# Patient Record
Sex: MALE | Employment: FULL TIME | URBAN - METROPOLITAN AREA
[De-identification: names, ages, dates, MRNs, and addresses within clinical notes are randomized per-mention and may not be internally consistent; named-entity substitution may affect disease eponyms.]

---

## 2020-06-26 ENCOUNTER — OFFICE VISIT (OUTPATIENT)
Dept: PRIMARY CARE CLINIC | Age: 30
End: 2020-06-26
Payer: COMMERCIAL

## 2020-06-26 DIAGNOSIS — R50.9 FEVER, UNSPECIFIED FEVER CAUSE: ICD-10-CM

## 2020-06-26 DIAGNOSIS — J02.9 ACUTE PHARYNGITIS, UNSPECIFIED ETIOLOGY: ICD-10-CM

## 2020-06-26 PROCEDURE — 99202 OFFICE O/P NEW SF 15 MIN: CPT | Performed by: NURSE PRACTITIONER

## 2020-06-26 RX ORDER — ELVITEGRAVIR, COBICISTAT, EMTRICITABINE, AND TENOFOVIR ALAFENAMIDE 150; 150; 200; 10 MG/1; MG/1; MG/1; MG/1
1 TABLET ORAL DAILY
COMMUNITY
Start: 2020-05-27

## 2020-06-26 SDOH — HEALTH STABILITY: MENTAL HEALTH: HOW OFTEN DO YOU HAVE A DRINK CONTAINING ALCOHOL?: 2-4 TIMES A MONTH

## 2020-06-26 ASSESSMENT — ENCOUNTER SYMPTOMS
VOMITING: 0
ABDOMINAL PAIN: 0
SORE THROAT: 1
SHORTNESS OF BREATH: 0
WHEEZING: 0
COUGH: 0
DIARRHEA: 0
NAUSEA: 0
CHEST TIGHTNESS: 0

## 2020-06-26 ASSESSMENT — PATIENT HEALTH QUESTIONNAIRE - PHQ9
1. LITTLE INTEREST OR PLEASURE IN DOING THINGS: 0
SUM OF ALL RESPONSES TO PHQ9 QUESTIONS 1 & 2: 0
2. FEELING DOWN, DEPRESSED OR HOPELESS: 0
SUM OF ALL RESPONSES TO PHQ QUESTIONS 1-9: 0
SUM OF ALL RESPONSES TO PHQ QUESTIONS 1-9: 0

## 2020-06-26 NOTE — PROGRESS NOTES
[address]  [address] [place] [address] Drive, [address]  Dept: [phone]    SUBJECTIVE:     Geraldo Valdez is a 26 y.o. male who presents on 6/26/2020 with:    HPI:  Chief Complaint   Patient presents with    Fever     up to 102.7 today     Fever and Sore Throat:  Artur Orozco was self-referred to [facility] for evaluation of: fever up to 102.7, myalgias, fatigue, occasional cough, headache. Pt states symptoms have been present x 1 day and have progressed since onset. Cough is non-productive, w/o associated wheezing or dyspnea. HA is frontal, non-thunderclap in origin, not worst of life. Other associated symptoms include: sore throat. Treatment to date: acetaminophen 6 hrs ago, which was somewhat effective. Pt denies: dyspnea, wheezing, chest tightness, rhinorrhea, N/V/D. Denies anosmia/ dysgeusia. Relevant PMH: HIV on Genvoya--> reports undetectable viral load. Relevant past family hx: MGM w/ T2DM. Smoking history: current daily cigarette smoker. never used smokeless tobacco.   Travel screen completed: yes- no recent travel. Pt denies known direct contact w/ someone who has tested positive or is under quarantine due to suspected/ confirmed COVID-19 within 14 days of symptom onset. No known recent close exposure to someone w/ proven streptococcal pharyngitis. 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 States the store has been quite busy since business reopening. Pt follows w/ ID at [facility] in [facility]. No PCP currently. Review of Systems   Constitutional: Positive for diaphoresis, fatigue and fever. HENT: Positive for sore throat. Negative for congestion and rhinorrhea. Respiratory: Negative for cough, chest tightness, shortness of breath and wheezing. Cardiovascular: Negative for chest pain and palpitations. Gastrointestinal: Negative for abdominal pain, diarrhea, nausea and vomiting.    Musculoskeletal: dry.      Capillary Refill: Capillary refill takes less than 2 seconds. Findings: No rash. Neurological:      General: No focal deficit present. Mental Status: He is alert and oriented to person, place, and time. Mental status is at baseline. Motor: Motor function is intact. Gait: Gait is intact. Psychiatric:         Mood and Affect: Mood and affect normal.         Speech: Speech normal.         Behavior: Behavior normal. Behavior is cooperative. Thought Content: Thought content normal.       TESTS:  [x] COVID-19 specimen was collected via oropharyngeal route and sent to lab. ASSESSMENT & PLAN:   26 y.o. male presenting w/ one day of fever, malaise, sore throat, occasional cough. Oropharyngeal swab for SARS-CoV-2 viral RNA testing and throat culture were obtained. We discussed strict self-isolation at home pending results, supportive treatment for symptoms, vigilant self-monitoring worsening or changes, and prompt follow-up w/ [facility] or [address] [place] [address] PCP to establish care. Diagnoses and all orders for this visit:     Diagnosis Orders   1. Fever, unspecified fever cause  Culture, Throat    COVID-19 Ambulatory   2. Acute pharyngitis, unspecified etiology  Culture, Throat    COVID-19 Ambulatory       Pt was sent home w/ written instructions for:  Possible COVID-19 infection w/ self-quarantine at home and management of symptoms  Do not leave home except to get medical care  Frequent hand washing, cover coughs/ sneezes, disinfect frequently touched surfaces daily  Maintain adequate rest and hydration  Continue OTC NSAIDs/ acetaminophen prn fever/ myalgias   Self-monitoring and prompt follow-up (call first) for worsening sx's and temp. checks BID     ED/ 9-1-1 immediately for high or refractory fevers, SOB, chest pain, shaking chills, persistent vomiting, any other worsening/ change/ concerns     Pt is in agreement w/ this care plan. All questions answered.     Jordan Fearing, APRN - CNP  6/26/20     This visit was provided as a focused evaluation during the COVID -19 pandemic/national emergency. A comprehensive review of all previous patient history and testing was not conducted. Pertinent findings were elicited during the visit.

## 2020-06-26 NOTE — PATIENT INSTRUCTIONS
We'll call with COVID-19 and throat culture results    Things to Know:   - Do not leave home except to get medical care  - Testing does not change treatment--> there is no medication that treats COVID-19  - Get plenty of rest and stay hydrated   - Salt water gargles as needed, humidify your environment  - Over the counter pain/ fever reducers such as ibuprofen (aka Motrin/ Advil) or acetaminophen (aka Tylenol) per dosage instructions as needed **please do not take these medications if you have a bleeding disorder, stomach or GI ulcer problems, or liver disease**   - Avoid exposure to environmental irritants, such as cigarette smoke, pollutants, seasonal allergens where possible  - Practice social distancing (maintaining a distance of 6 feet from other persons) when leaving home is necessary.  - Temperature checks twice daily     Please be vigilant in self-monitoring of symptoms and temperature. Symptoms may develop 2 days to 2 weeks following exposure to the virus. If you are exposed after testing, or if you were in the incubation period when tested, you could become ill with COVID-19 later. Keep a low threshold to go to ER or call 9-1-1- for new or suddenly worsening symptoms --> change in nature of cough, high fevers, trouble breathing, persistent vomiting, or you have any other emergency warning signs, or if you think it is an emergency. Advance Care Planning  People with COVID-19 may have no symptoms, mild symptoms, such as fever, cough, and shortness of breath or they may have more severe illness, developing severe and fatal pneumonia. As a result, Advance Care Planning with attention to naming a health care decision maker (someone you trust to make healthcare decisions for you if you could not speak for yourself) and sharing other health care preferences is important BEFORE a possible health crisis. Please contact your Primary Care Provider to discuss Advance Care Planning.     Preventing the Spread of Coronavirus Disease 2019 in Homes and Residential Communities  For the most recent information go to [email or web address]    Prevention steps for People with confirmed or suspected COVID-19 (including persons under investigation) who do not need to be hospitalized  and   People with confirmed COVID-19 who were hospitalized and determined to be medically stable to go home    Your healthcare provider and public health staff will evaluate whether you can be cared for at home. If it is determined that you do not need to be hospitalized and can be isolated at home, you will be monitored by staff from your local or state health department. You should follow the prevention steps below until a healthcare provider or local or state health department says you can return to your normal activities. Stay home except to get medical care  People who are mildly ill with COVID-19 are able to isolate at home during their illness. You should restrict activities outside your home, except for getting medical care. Do not go to work, school, or public areas. Avoid using public transportation, ride-sharing, or taxis. Separate yourself from other people and animals in your home  People: As much as possible, you should stay in a specific room and away from other people in your home. Also, you should use a separate bathroom, if available. Animals: You should restrict contact with pets and other animals while you are sick with COVID-19, just like you would around other people. Although there have not been reports of pets or other animals becoming sick with COVID-19, it is still recommended that people sick with COVID-19 limit contact with animals until more information is known about the virus. When possible, have another member of your household care for your animals while you are sick.  If you are sick with COVID-19, avoid contact with your pet, including petting, health departments.

## 2020-06-27 DIAGNOSIS — J02.9 ACUTE PHARYNGITIS, UNSPECIFIED ETIOLOGY: ICD-10-CM

## 2020-06-27 DIAGNOSIS — R50.9 FEVER, UNSPECIFIED FEVER CAUSE: ICD-10-CM

## 2020-06-29 LAB — THROAT CULTURE: NORMAL

## 2020-07-02 LAB
SARS-COV-2: NOT DETECTED
SOURCE: NORMAL

## 2020-07-06 VITALS
BODY MASS INDEX: 28.79 KG/M2 | TEMPERATURE: 98.3 F | HEART RATE: 90 BPM | HEIGHT: 68 IN | RESPIRATION RATE: 14 BRPM | WEIGHT: 190 LBS | OXYGEN SATURATION: 98 %

## 2020-07-06 ASSESSMENT — ENCOUNTER SYMPTOMS: RHINORRHEA: 0

## 2021-02-04 ENCOUNTER — TELEPHONE (OUTPATIENT)
Dept: INFECTIOUS DISEASES | Age: 31
End: 2021-02-04

## 2021-02-04 NOTE — TELEPHONE ENCOUNTER
Pt called in requesting to transfer his care from [address] [place] [address] Road. Family member is pt here as well. Lives in [address] [facility] referral -- yes  New dx -- no  Insurance-- yes   Income- yes  Medication-- on medication  Last MD and labs-- will need all new lab work. Car-- denies issue  RW program-- aware of program  Eligibility-- reviewed needed documentation. Intake scheduled 2.11.2021    Requested he call office if needed. All office information reviewed and he denied questions.

## 2021-02-11 ENCOUNTER — NURSE ONLY (OUTPATIENT)
Dept: INFECTIOUS DISEASES | Age: 31
End: 2021-02-11

## 2021-02-11 DIAGNOSIS — B20 HUMAN IMMUNODEFICIENCY VIRUS (HCC): Primary | ICD-10-CM

## 2021-02-11 DIAGNOSIS — B20 HIV INFECTION, UNSPECIFIED SYMPTOM STATUS (HCC): ICD-10-CM

## 2021-02-11 DIAGNOSIS — B20 HIV INFECTION, UNSPECIFIED SYMPTOM STATUS (HCC): Primary | ICD-10-CM

## 2021-02-11 LAB
ALBUMIN SERPL-MCNC: 4.8 G/DL (ref 3.5–4.6)
ALP BLD-CCNC: 22 U/L (ref 35–104)
ALT SERPL-CCNC: 18 U/L (ref 0–41)
ANION GAP SERPL CALCULATED.3IONS-SCNC: 10 MEQ/L (ref 9–15)
AST SERPL-CCNC: 21 U/L (ref 0–40)
BILIRUB SERPL-MCNC: 0.6 MG/DL (ref 0.2–0.7)
BUN BLDV-MCNC: 16 MG/DL (ref 6–20)
CALCIUM SERPL-MCNC: 10.2 MG/DL (ref 8.5–9.9)
CHLORIDE BLD-SCNC: 101 MEQ/L (ref 95–107)
CHOLESTEROL, TOTAL: 220 MG/DL (ref 0–199)
CO2: 28 MEQ/L (ref 20–31)
CREAT SERPL-MCNC: 1.15 MG/DL (ref 0.7–1.2)
GFR AFRICAN AMERICAN: >60
GFR NON-AFRICAN AMERICAN: >60
GLOBULIN: 3.2 G/DL (ref 2.3–3.5)
GLUCOSE BLD-MCNC: 117 MG/DL (ref 70–99)
HAV IGM SER IA-ACNC: NORMAL
HBV SURFACE AB TITR SER: REACTIVE MIU/ML
HCT VFR BLD CALC: 44.5 % (ref 42–52)
HDLC SERPL-MCNC: 40 MG/DL (ref 40–59)
HEMOGLOBIN: 15.3 G/DL (ref 14–18)
HEPATITIS B CORE IGM ANTIBODY: NORMAL
HEPATITIS B SURFACE ANTIGEN INTERPRETATION: NORMAL
HEPATITIS C ANTIBODY INTERPRETATION: NORMAL
HEPATITIS INTERPRETATION:: NORMAL
LDL CHOLESTEROL CALCULATED: 131 MG/DL (ref 0–129)
MCH RBC QN AUTO: 32.1 PG (ref 27–31.3)
MCHC RBC AUTO-ENTMCNC: 34.3 % (ref 33–37)
MCV RBC AUTO: 93.7 FL (ref 80–100)
PDW BLD-RTO: 12.7 % (ref 11.5–14.5)
PLATELET # BLD: 204 K/UL (ref 130–400)
POTASSIUM SERPL-SCNC: 4.2 MEQ/L (ref 3.4–4.9)
RBC # BLD: 4.75 M/UL (ref 4.7–6.1)
SODIUM BLD-SCNC: 139 MEQ/L (ref 135–144)
TOTAL PROTEIN: 8 G/DL (ref 6.3–8)
TRIGL SERPL-MCNC: 247 MG/DL (ref 0–150)
WBC # BLD: 5.3 K/UL (ref 4.8–10.8)

## 2021-02-11 NOTE — PROGRESS NOTES
RN Intake      Patient ID:   Julia Medley  Date:   2/11/2021      Client ID:    SEBASTIÁN:[id number]    OVER INCOME for [employer] 500.43 % FPL      Residency:   [address] [place], [place] [address] [facility]  [phone] (home)   [x] OK to leave voicemail     Race: white    Family/House:    [] Partner  [] Children  [x] Other - alone  No partner  Housing:   home    Transport:  car  Discussed help with transport only as last resort. 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 Discussed referral if needed. -- NA  Substance Abuse:   Social History     Socioeconomic History    Marital status: Unknown     Spouse name: Not on file    Number of children: Not on file    Years of education: Not on file    Highest education level: Not on file   Occupational History    Not on file   Social Needs    Financial resource strain: Not on file    Food insecurity     Worry: Not on file     Inability: Not on file    Transportation needs     Medical: Not on file     Non-medical: Not on file   Tobacco Use    Smoking status: Current Some Day Smoker     Types: Cigarettes    Smokeless tobacco: Never Used   Substance and Sexual Activity    Alcohol use: Yes     Frequency: 2-4 times a month    Drug use: Yes     Types: Marijuana    Sexual activity: Not on file   Lifestyle    Physical activity     Days per week: Not on file     Minutes per session: Not on file    Stress: Not on file   Relationships    Social connections     Talks on phone: Not on file     Gets together: Not on file     Attends Church service: Not on file     Active member of club or organization: Not on file     Attends meetings of clubs or organizations: Not on file     Relationship status: Not on file    Intimate partner violence     Fear of current or ex partner: Not on file     Emotionally abused: Not on file     Physically abused: Not on file     Forced sexual activity: Not on file   Other Topics Concern    Not on file   Social History Narrative    Not on file     Smoking -- occasionally   ETOH -- socially-- denies this as a problem  THC--- occasionally  Drugs-- denies any other drug use. Diagnosis:   Date of diagnosis:  2/2017   Transmition:  homosexual contact   Signs and symptoms:   [] Diarrhea  [] Night sweats   [] Dizziness  [] Chronic fatigue   [] Vomiting  [] Nausea   [] Hand/feet pain [] Headaches   [] Swollen lymph [] Skin rash   [] Fevers  [] Body aches   [] Weight loss  [x] Other - denies all    Opportunistic Infections:  AT THIS TIME--- NA    Pt denies any OI at time of dx as well   [] Thrush -   [] Wasting   [] KS   [] PCP   [] CMV   [] STD -    STD history---- denies    Pt is aware of U=U, undetectable equals un-transmittable. Pt is also aware of the unlikelihood of HIV transmission if a pt has been undetectable (viral load). Safe sex practices are still encouraged to prevent transmission of other STI's, such as, chlamydia, herpes, GC.    1 partner in the past 6 months. States person was on PREP he thinks. Did disclose status and is aware of the felonous law. Health/Healthcare:     PCP Visit:  Discussed the need. ID Visit:  Will be Dr Hector Dempsey Visit:  Denise couple months ago\" Dr Beverly Og  Discussed importance of twice a year visits. Encouraged follow up. Last lab work:  Pt will complete labs at [facility] today    CD4:   No results found for: LABABSO    Viral Load:  No results found for: [id number], [id number]    Discussed about CD4 and VL and the importance of these labs.    Reviewed all labs ordered and what each one means and why it is necessary  Medical Insurance:  Payor: Ronny Lorenzana / Plan: Ronny Lorenzana / Product Type: *No Product type* /    OHDAP/HIPSCA:     Renewal Date:    Pt states no changes to current insurance. Met with [facility]     Eligibility date: 2.11.2021             OVER INCOME for [employer] 500.43 % FPL    Allergies:  has No Known Allergies. No changes    Current Medications:  Current Outpatient Medications on File Prior to Visit   Medication Sig Dispense Refill    GENVOYA [phone] MG TABLET Take 1 tablet by mouth daily       No current facility-administered medications on file prior to visit. Reviewed and confirmed with pt. History of HIV Medications or current regimen:   Akanksha Lose only. 100% compliance per pt. Tolerating well. Denied issues  Pharmacy:    No Pharmacies Listed    Will be [facility]  [phone]  Legal Issues:    Hx of DUI years ago    Emergency Contact:   Viraj Muñoz -- mother [phone]-- yes    Concerns:     [address]--- no  Drug Abuse-- no  Housing-- no  Support-- no  Income-- no  Compliance-- no      Support System:    [] Support group        [] Spouse       [x] Family         [x] Friends       [] Clubs  [] Spiritual practices   [] Buddhist        [] Clergy         [] "[employer]"   [] Therapist      [] Hobbies                  [] Other -   Denies problems or concerns with support system. RN Comments:  Pt here today for intake. Transfer from [facility]. Will attempt to obtain records. Discussed compliance -- states he does well. No misses  Discussed readyness to be in care and on life long medications-- he has been on ART since 2/2017 and compliant. Scheduled new pt apt. Labs given to pt. He will go to [facility] today. Has over 1 month of medications from [facility]  Pt denies need for PPE right now. Co pay card given to pt once he transfers pharmacy he will need to use. Updates. No changes to insurance. No changes to income. Will provide when eligibility is updated.    HIV primary care-- hospital out pt  Housing-- stable  HIV risk counseling--- yes  Mental health-- yes  Substance abuse--- yes    VACC-- refused flu vaccine. Not sure what other vaccines he received. OVER INCOME for [employer] 500.43 % FPL    Denies any needs at this time. Denies any issues that need Doctor attention.  Will call with concerns

## 2021-02-11 NOTE — PROGRESS NOTES
Client Psychosocial Assessment      Patient ID:   Aiyana Calle  Date:   21    Client ID:  [id number]  SSN:   [unfilled]  :  1990  :  Son Rodney    [address] [address] [facility]  [phone] (home)   [x] OK to leave voicemail    Gender:  male Race:  [address] of Residency:  1D  Consortia of Agency:  1D    Referral Source:  Self    Family and Professional Contacts:    Extended Emergency Contact Information  Primary Emergency Contact: one, no  Home Phone: [phone]  Relation: Other  Preferred language: Declined   needed? No     No primary care provider on file. No primary provider on file. Name: Aware of DX: Supportive: Contact: Address/Phone:   Spouse/Partner:      No No No    Parents:     Anne Vernon     Yes Yes Yes    Siblings:      Yes Yes No    Children:      No No No    Others:      No No No    Professional Contacts:      No No No             Medical Insurance:    Payor: Tameka Montano / Plan: Tameka Montano / Product Type: *No Product type* /     [address] [facility] outpatient clinic/department    Applied for HIV Rx Program:   OHDAP:  No   Patient Assistance:  No    Diagnosis:    Self-Reported Diagnosis:     HIV:  Yes - Date: 2017   AIDS:  No    Current Physician:     No primary care provider on file. No primary provider on file.      Phone:  Dr. Sandhya Paulson on file: Yes Hospital Affiliation:     [facility]     Phone: [phone] Worker:          Phone:      HIV + Asymptomatic:  No  HIV + Symptomatic:  Yes - Date: 2017    CD4:   No results found for: LABABSO    Viral Load:  No results found for: [id number], [id number]    HIV Exposure Checklist:    [x] Men who have sex with men (MSM)  [] Injection drug use (IDU)  [] Women who have sex with women (WSW) [] Sexual contact with IDU  [] Heterosexual sexual contact   [] Sexual abuse or assault  [] Worked in health care setting   [] Unknown -   [] If under 15, mother with HIV/AIDS  [] Transfusion of blood, receipt of blood components, or receipt of clotting factor for coagulation disorder    Current Symptoms:  [] Diarrhea [] Chronic fatigue [] Pain in hand(s)/feet [] Skin rashes  [] Dizziness [] Vomiting  [] Persistent headaches [] Night sweats   [] Nausea [] Body aches  [] Swollen lymph nodes [] Persistent fevers  [] Weight loss in lbs -     [] Other -     Opportunistic Infections/AIDS Diagnosis:  [] Pneumocystis pneumonia (PCP) [] T-cell count <200  [] Wasting syndrome  [] Kaposi's Sarcoma ([place])  [] Invasive Cervical Cancer [] Toxoplasmosis  [] Cryptococcal Meningitis  [] Cryptosporidiosis  [] Lymphoma-type  [] Cytomegalovirus-eyes (CMV) [] Retinitis (CMV)  [] Tuberculosis (TB)  [] Encephalopathy (HIV Dementia) [] Candida Esophagitis [] Histoplasmosis   [] Invasive Herpes Simplex infection [] Salmonella   [] Thrush  [] Recurrent Bacterical Pneumonia   [] Isoporiasis (with diarrhea for more than a month)  [] Disseminated Mycobacterium Avium Complex (MAC)    []  Progressive Multifocal Leukoencephalopathy (PML)  [] Other -     Histories:    Past Medical History:   Diagnosis Date    HIV (human immunodeficiency virus infection) ([facility] [address]      Past Surgical History:   Procedure Laterality Date    KNEE CARTILAGE SURGERY Left 2005     Social History     Socioeconomic History    Marital status: Unknown     Spouse name: Not on file    Number of children: Not on file    Years of education: Not on file    Highest education level: Not on file   Occupational History    Not on file   Social Needs    Financial resource strain: Not on file    Food insecurity     Worry: Not on file     Inability: Not on file    Transportation needs     Medical: Not on file     Non-medical: Not on file   Tobacco Use    Smoking status: Current Some Day Smoker     Types: Cigarettes    Smokeless tobacco: Never Used   Substance and Sexual Activity    Alcohol use: Yes     Frequency: 2-4 times a month    Drug use: Yes     Types: Marijuana    Sexual activity: Not on file   Lifestyle    Physical activity     Days per week: Not on file     Minutes per session: Not on file    Stress: Not on file   Relationships    Social connections     Talks on phone: Not on file     Gets together: Not on file     Attends Pentecostalism service: Not on file     Active member of club or organization: Not on file     Attends meetings of clubs or organizations: Not on file     Relationship status: Not on file    Intimate partner violence     Fear of current or ex partner: Not on file     Emotionally abused: Not on file     Physically abused: Not on file     Forced sexual activity: Not on file   Other Topics Concern    Not on file   Social History Narrative    Not on file       Drug most frequently used by client:  Marijuana   How often:  occasionally   Is this perceived as a problem by the client:  N   The client has experienced the following symptoms:    Hallucinations:  No    Blackouts:  No    Tremors:  No    Family History   Problem Relation Age of Onset    Lupus Mother     No Known Problems Father     Diabetes type 2  Maternal Grandmother        Family history and impact of drugs/alcohol:  NA    Present and past treatment programs attended by client:  NA      Current Outpatient Medications on File Prior to Visit   Medication Sig Dispense Refill    GENVOYA [phone] MG TABLET Take 1 tablet by mouth daily       No current facility-administered medications on file prior to visit. There is no immunization history on file for this patient. HIV/AIDS Hospitalizations:  NA    Legal:    Documents: Filed:  of Record: Address: Phone: Will N      Living Will N      Durable POA N      Power of FreeBrie N        Legal Histories:  Arrests:  Y  Bankruptcy:  No  Divorce:  No  Judgements:  No  Convictions:  No  D.U. I.:  Yes  Law Suits:  No  Incarcerations:  No  Brandt:  No  Probation:  No    Psycho-Social - Sources of Social and Emotion Support:    [] Support group [] Spouse [x] Family [x] Friends [] Clubs  [] Spiritual practices [] Baptism [] Clergy [] VerticalResponse [] Therapist   [] Hobbies  [] Other -     Have you ever received in/out patient therapy/counseling? [x]     Hospitalizations for Chemical Dependency or Mental Health Issues:   [x]     Are you currently in treatment? [x]     Current Mental Health Issues:  [] Depression  [] Sexual identity [] Extreme frustration  [] Anxiety  [] Mood swings [] Violence  [] Schizophrenia  [] Bipolar  []  Extreme anger [] Suicide ideation [] Self-destructive behavior   []  Other -   [] Other sexual issues -     Housing and Level of Care:      Living arrangements:  Owns    The client currently lives alone. Monthly rent/mortgage:  $918  Payable to: Address of landlord (if applicable):      Level of care:   Independent; able to perform all daily living skills    Employment:       [] Employed  [] Not employed    Occupation:     Last date worked:     Type:  Full-time   Looking for work:     Self:     Retired:     Place of employment (most recent):     Disabled/Terminated:       Unable to work due to HIV:  No  Served in the [employer]:  No  Veterans' benefits:  None    Education:  Highest level completed:  H/S  Current student:  No    Financial:    Monthly income:  $5,832.67  Total income:  $69,992.94?yr     Pending: Approved: Amount:   Salary     $33.65/hr   [address] [facility] [address]        Temporary Assistance for [place]  81.        Other -           Monthly Expenses:  $1,998.00/month  Total Expenses:  $    Item: Amount: Item: Amount: Item: Amount:   Prescriptions   copay Auto [employer] [facility] Rent/Mortgage   [address]  Cable TV 25   Groceries   [address]   Transportation    Refuse  Other:         Toiletries 30 Other:       Case Management:    Internal Service Offered Service Requested Not Interested   Volunteer      Emergency Assistance Funds      Legal Referral      Transportation      Pastoral Care Referral      HIV + Support group      Educational materials      HIV Drug Program x     Counseling referral      Significant Other support group      Newsletter      AIDS 101 information      Safer sex information      Title I      Title III      Title IV      Physician referral x     Dental referral      MARIANO      Comments:       Case Management (Other Assistance):  External Eligible Involved Application Date Approval Date   Home Delivered Meals       Food Reagan       TANF       [facility]       Medicaid       Medicaid Waiver       Medicare       DA       [address] health care       Hospice       Nursing home       Counseling       Respite care       Substance abuse Tx Program       ESTELLA       Other -       Comments: Pt presented for scheduled Intake with CM and RN. CM completed PSA. Reviewed Grievance Policy and Sliding Scale Fee with pt. CM assessed RW Part A Eligibility for services. Pt is 543% of PL. Pt is not eligible for services based on income guidelines. Pt is knowledgeable about HIV disease. Diagnosed 2/2017 with HIV. Pt is compliant with care. Co-pay cards will be provided to assist with co-pay payments, prescribed Genvoya. CM will follow up with pt as needed. Alfonzo Noguera, personally performed the services described in this documentation on 2/11/21.     Electronically signed by: Baldemar Renteria  2/11/21 11:45 AM

## 2021-02-12 LAB
ABSOLUTE CD 4 HELPER: 804 CELLS/UL (ref 430–1800)
CD4 % HELPER T CELL: 34 % (ref 32–64)
HIV AG/AB: REACTIVE
LYMPHOCYTE SUBSET PANEL 2 INFO: NORMAL
RPR: NORMAL

## 2021-02-13 LAB
HIV INTERPRETATION: ABNORMAL
HIV-1 ANTIBODY: ABNORMAL
HIV-2 AB: NEGATIVE
QUANTI TB GOLD PLUS: NEGATIVE
QUANTI TB1 MINUS NIL: 0.02 IU/ML (ref 0–0.34)
QUANTI TB2 MINUS NIL: 0.03 IU/ML (ref 0–0.34)
QUANTIFERON MITOGEN: >10 IU/ML
QUANTIFERON NIL: 0.05 IU/ML

## 2021-02-15 LAB
HIV-1 QNT LOG, IU/ML: <1.47 LOG CPY/ML
HIV-1 QNT, IU/ML: ABNORMAL CPY/ML
INTERPRETATION: DETECTED

## 2021-02-17 LAB
C. TRACHOMATIS DNA ,URINE: NEGATIVE
N. GONORRHOEAE DNA, URINE: NEGATIVE

## 2021-02-19 LAB
EER HIV-1 GENOTYPE BY SEQUENCE: NORMAL
HIV-1 GENOTYPE: NORMAL
HIV-1 INTEGRASE INHIBITOR RESISTANCE, SEQUENCE: NORMAL

## 2021-03-09 ENCOUNTER — OFFICE VISIT (OUTPATIENT)
Dept: INFECTIOUS DISEASES | Age: 31
End: 2021-03-09
Payer: COMMERCIAL

## 2021-03-09 VITALS
HEART RATE: 81 BPM | TEMPERATURE: 98.4 F | RESPIRATION RATE: 18 BRPM | DIASTOLIC BLOOD PRESSURE: 82 MMHG | SYSTOLIC BLOOD PRESSURE: 142 MMHG

## 2021-03-09 DIAGNOSIS — Z21 ASYMPTOMATIC HIV INFECTION (HCC): Primary | ICD-10-CM

## 2021-03-09 DIAGNOSIS — B20 HIV INFECTION, UNSPECIFIED SYMPTOM STATUS (HCC): ICD-10-CM

## 2021-03-09 PROCEDURE — 99203 OFFICE O/P NEW LOW 30 MIN: CPT | Performed by: INTERNAL MEDICINE

## 2021-03-09 ASSESSMENT — ENCOUNTER SYMPTOMS
GASTROINTESTINAL NEGATIVE: 1
RESPIRATORY NEGATIVE: 1

## 2021-03-09 NOTE — PROGRESS NOTES
New B20 transfer. States 100% compliance with Genvoya. Been taking since his dx. No issues. Denies any side effects at all. Pt stays active. No partner at this time. Pt states he is at a new location at his job, and has been experiencing some stress, but handles it well. Healthy habits. Been stable with employment for 10 years.

## 2021-03-09 NOTE — PROGRESS NOTES
Infectious Disease     Patient Name: Lakeisha Villalta  Date: 3/9/2021  YOB: 1990  Medical Record Number: [id number]                  HIV      History of Present Illness:    HIV positive since 2017       CD4 % [place] T Cell 34  32 - 64 % Final 02/11/2021 12:19 PM [facility]   Absolute CD 4 [place] 804  430 - 1800 cells/uL Final 02/11/2021 12:19 PM      HIV-1 QNT, IU/ML <30 Detected  cpy/mL Final 02/11/2021 12:19 PM [facility]   HIV-1 QNT Log, IU/ML <1.47  log cpy/mL Final 02/11/2021 12:19 PM      Currently taking Genvoya (Elvitegravir-cobicistat-emtricitabine-tenofovir alafenamide)  has been on for 4 years            Review of Systems   Constitutional: Negative. HENT: Negative. Respiratory: Negative. Cardiovascular: Negative. Gastrointestinal: Negative. Genitourinary: Negative. Musculoskeletal: Negative. Neurological: Negative. Hematological: Negative. Review of Systems: All 14 review of systems negative other than as stated above    Social History     Tobacco Use    Smoking status: Current Some Day Smoker     Types: Cigarettes    Smokeless tobacco: Never Used   Substance Use Topics    Alcohol use: Yes     Frequency: 2-4 times a month    Drug use: Yes     Types: Marijuana         Past Medical History:   Diagnosis Date    HIV (human immunodeficiency virus infection) ([facility] [address]            Past Surgical History:   Procedure Laterality Date    KNEE CARTILAGE SURGERY Left 2005         Current Outpatient Medications on File Prior to Visit   Medication Sig Dispense Refill    GENVOYA [id number] MG TABLET Take 1 tablet by mouth daily       No current facility-administered medications on file prior to visit. No Known Allergies      Family History   Problem Relation Age of Onset    Lupus Mother     No Known Problems Father     Diabetes type 2  Maternal Grandmother          Physical Exam:      Physical Exam   Constitutional: No distress. HENT:   Head: Normocephalic.    Eyes: Pupils are equal, round, and reactive to light. Neck: Normal range of motion. No JVD present. No tracheal deviation present. No thyromegaly present. Cardiovascular:   No murmur heard. Pulmonary/Chest: Effort normal and breath sounds normal. No respiratory distress. He has no wheezes. He has no rales. He exhibits no tenderness. Abdominal: Bowel sounds are normal. He exhibits no distension and no mass. There is no abdominal tenderness. There is no rebound and no guarding. Musculoskeletal:         General: No tenderness or edema. Lymphadenopathy:     He has no cervical adenopathy. Neurological: He is alert. Skin: Skin is warm. He is not diaphoretic. No erythema. Blood pressure (!) 142/82, pulse 81, temperature 98.4 °F (36.9 °C), resp. rate 18.       .   Lab Results   Component Value Date    WBC 5.3 02/11/2021    HGB 15.3 02/11/2021    HCT 44.5 02/11/2021    MCV 93.7 02/11/2021     02/11/2021     Lab Results   Component Value Date     02/11/2021    K 4.2 02/11/2021     02/11/2021    CO2 28 02/11/2021    BUN 16 02/11/2021    CREATININE 1.15 02/11/2021    GLUCOSE 117 02/11/2021    CALCIUM 10.2 02/11/2021                ASSESSMENT:  PLAN:    Doing well overall stable HIV follow-up 6 months continue with  Genvoya (Elvitegravir-cobicistat-emtricitabine-tenofovir alafenamide)

## 2021-05-17 ENCOUNTER — TELEPHONE (OUTPATIENT)
Dept: INFECTIOUS DISEASES | Age: 31
End: 2021-05-17

## 2021-05-20 ENCOUNTER — NURSE ONLY (OUTPATIENT)
Dept: INFECTIOUS DISEASES | Age: 31
End: 2021-05-20

## 2021-05-20 DIAGNOSIS — B20 HUMAN IMMUNODEFICIENCY VIRUS (HCC): Primary | ICD-10-CM

## 2021-09-09 ENCOUNTER — NURSE ONLY (OUTPATIENT)
Dept: INFECTIOUS DISEASES | Age: 31
End: 2021-09-09

## 2021-09-09 ENCOUNTER — OFFICE VISIT (OUTPATIENT)
Dept: INFECTIOUS DISEASES | Age: 31
End: 2021-09-09
Payer: COMMERCIAL

## 2021-09-09 VITALS
HEART RATE: 88 BPM | SYSTOLIC BLOOD PRESSURE: 123 MMHG | WEIGHT: 205.2 LBS | BODY MASS INDEX: 31.2 KG/M2 | DIASTOLIC BLOOD PRESSURE: 77 MMHG | TEMPERATURE: 98.7 F

## 2021-09-09 DIAGNOSIS — Z21 ASYMPTOMATIC HIV INFECTION (HCC): Primary | ICD-10-CM

## 2021-09-09 PROCEDURE — 99213 OFFICE O/P EST LOW 20 MIN: CPT | Performed by: INTERNAL MEDICINE

## 2021-09-09 ASSESSMENT — ENCOUNTER SYMPTOMS
RESPIRATORY NEGATIVE: 1
GASTROINTESTINAL NEGATIVE: 1

## 2021-09-09 NOTE — PROGRESS NOTES
Infectious Disease     Patient Name: Pablo Or  Date: 9/9/2021  YOB: 1990  Medical Record Number: [id number]             HIV  Genvoya (Elvitegravir-cobicistat-emtricitabine-tenofovir alafenamide)        CD4 % [place] T Cell 34  32 - 64 % Final 02/11/2021 12:19 PM [facility]   Absolute CD 4 [place] 804  430 - 1800 cells/uL Final 02/11/2021 12:19 PM                HIV-1 QNT, IU/ML <30 Detected  cpy/mL Final 02/11/2021 12:19 PM [facility]   HIV-1 QNT Log, IU/ML <1.47  log cpy/mL Final 02/11/2021 12:19 PM          Doing well no fevers chills sweats nausea vomiting diarrhea no trouble breathing no weight loss        Review of Systems   Constitutional: Negative. Respiratory: Negative. Cardiovascular: Negative. Gastrointestinal: Negative. Genitourinary: Negative. Musculoskeletal: Negative. Neurological: Negative. Review of Systems: All 14 review of systems negative other than as stated above    Social History     Tobacco Use    Smoking status: Current Some Day Smoker     Types: Cigarettes    Smokeless tobacco: Never Used   Substance Use Topics    Alcohol use: Yes    Drug use: Yes     Types: Marijuana         Past Medical History:   Diagnosis Date    HIV (human immunodeficiency virus infection) ([facility] [address]            Past Surgical History:   Procedure Laterality Date    KNEE CARTILAGE SURGERY Left 2005         Current Outpatient Medications on File Prior to Visit   Medication Sig Dispense Refill    GENVOYA [phone] MG TABLET Take 1 tablet by mouth daily       No current facility-administered medications on file prior to visit. No Known Allergies      Family History   Problem Relation Age of Onset    Lupus Mother     No Known Problems Father     Diabetes type 2  Maternal Grandmother          Physical Exam:      Physical Exam  Constitutional:       General: He is not in acute distress. Appearance: Normal appearance. He is not ill-appearing.    HENT:      Head: Normocephalic and atraumatic. Cardiovascular:      Heart sounds: Normal heart sounds. No murmur heard. Pulmonary:      Effort: Pulmonary effort is normal. No respiratory distress. Breath sounds: Normal breath sounds. No stridor. No wheezing, rhonchi or rales. Chest:      Chest wall: No tenderness. Abdominal:      General: Abdomen is flat. Bowel sounds are normal. There is no distension. Palpations: There is no mass. Tenderness: There is no abdominal tenderness. There is no guarding or rebound. Hernia: No hernia is present. Neurological:      Mental Status: He is alert. /77   Pulse 88   Temp 98.7 °F (37.1 °C)   Wt 205 lb 3.2 oz (93.1 kg)   BMI 31.20 kg/m²       .    Lab Results   Component Value Date    WBC 9.5 09/08/2021    HGB 15.0 09/08/2021    HCT 42.9 09/08/2021    MCV 92.3 09/08/2021     09/08/2021     Lab Results   Component Value Date     09/08/2021    K 3.8 09/08/2021     09/08/2021    CO2 27 09/08/2021    BUN 18 09/08/2021    CREATININE 1.07 09/08/2021    GLUCOSE 109 09/08/2021    CALCIUM 9.6 09/08/2021            ASSESSMENT:  PLAN:      Asymptomatic HIV

## 2021-10-05 ENCOUNTER — NURSE ONLY (OUTPATIENT)
Dept: INFECTIOUS DISEASES | Age: 31
End: 2021-10-05

## 2021-10-05 NOTE — PROGRESS NOTES
Re-Assessment      Patient ID:   Bruna Mullen  Date:   10/5/2021      Client ID:  [id number]    [address] [facility] [address] [facility]  [phone] (home)     Family/House:    [] Partner  [] Children  [] Other -     Mental Health:   NA    Substance Abuse:   Marijuana Use  Social History     Socioeconomic History    Marital status: Unknown     Spouse name: Not on file    Number of children: Not on file    Years of education: Not on file    Highest education level: Not on file   Occupational History    Not on file   Tobacco Use    Smoking status: Former Smoker     Types: Cigarettes     Quit date: 2021     Years since quittin.2    Smokeless tobacco: Never Used   Substance and Sexual Activity    Alcohol use: Yes    Drug use: Yes     Types: Marijuana    Sexual activity: Not on file   Other Topics Concern    Not on file   Social History Narrative    Not on file     Social Determinants of Health     Financial Resource Strain:     Difficulty of Paying Living Expenses:    Food Insecurity:     Worried About Running Out of Food in the Last Year:     [address] in the Last Year:    Transportation Needs:     Lack of Transportation (Medical):      Lack of Transportation (Non-Medical):    Physical Activity:     Days of Exercise per Week:     Minutes of Exercise per Session:    Stress:     Feeling of Stress :    Social Connections:     Frequency of Communication with Friends and Family:     Frequency of Social Gatherings with Friends and Family:     Attends Congregation Services:     Active Member of Clubs or Organizations:     Attends Club or Organization Meetings:     Marital Status:    Intimate Partner Violence:     Fear of Current or Ex-Partner:     Emotionally Abused:     Physically Abused:     Sexually Abused:        Housing:   with family    Health/Healthcare:  HIV Stable    Physician Visit:  Dr. Pawan Madera    Dental Visit:  Last visit-     CD4:   Lab Results   Component Value Date    LABABSO 625 09/08/2021       Viral Load:  Lab Results   Component Value Date    [id number] Not Detected 09/08/2021       Medical Insurance:  Payor: Marcia De Leon / Plan: Marcia De Leon / Product Type: *No Product type* /    OHDAP/HIPSCA:  NA   Renewal Date:  NA    Income:    Employment    Legal Issues:    NA    Emergency Contact:   Extended Emergency Contact Information  Primary Emergency Contact: [address] [facility] [address] Phone: [phone]  Mobile Phone: [phone]  Relation: Other  Preferred language: Declined   needed? No  Appointment completed via phone. CM called pt for scheduled appointment. During initial intake 2/21, pt was not eligible for RW Part A services due to income. Pt is now eligible due to change in income. CM assessed RW Part Eligibility. Pt has insurance via employer. Pt works full-time. CM reviewed GrSqueece Policy and Sliding Scale with pt. CM completed PSA and SA/MH Assessments. Pt has no hx of MH, smokes marijuana occasionally, 3-4 times/day. CM completed ISP, pt agreed with POC. Pt is compliant with care. VL  From 9/21 is undetectable. Last dental visit was 7/21. Pt is sexually active, is aware of U=U, undetectable equals un-transmittable. CM provided socialization, no specific issues discussed. CM will follow up with pt as needed.

## 2021-12-08 ENCOUNTER — OFFICE VISIT (OUTPATIENT)
Dept: FAMILY MEDICINE CLINIC | Age: 31
End: 2021-12-08
Payer: COMMERCIAL

## 2021-12-08 VITALS
DIASTOLIC BLOOD PRESSURE: 60 MMHG | BODY MASS INDEX: 29.4 KG/M2 | HEART RATE: 76 BPM | WEIGHT: 194 LBS | TEMPERATURE: 98.8 F | OXYGEN SATURATION: 98 % | RESPIRATION RATE: 18 BRPM | HEIGHT: 68 IN | SYSTOLIC BLOOD PRESSURE: 122 MMHG

## 2021-12-08 DIAGNOSIS — N50.812 PAIN IN LEFT TESTICLE: Primary | ICD-10-CM

## 2021-12-08 DIAGNOSIS — N50.89 SWELLING OF LEFT TESTICLE: ICD-10-CM

## 2021-12-08 PROCEDURE — 99213 OFFICE O/P EST LOW 20 MIN: CPT | Performed by: NURSE PRACTITIONER

## 2021-12-08 SDOH — ECONOMIC STABILITY: FOOD INSECURITY: WITHIN THE PAST 12 MONTHS, YOU WORRIED THAT YOUR FOOD WOULD RUN OUT BEFORE YOU GOT MONEY TO BUY MORE.: NEVER TRUE

## 2021-12-08 SDOH — ECONOMIC STABILITY: FOOD INSECURITY: WITHIN THE PAST 12 MONTHS, THE FOOD YOU BOUGHT JUST DIDN'T LAST AND YOU DIDN'T HAVE MONEY TO GET MORE.: NEVER TRUE

## 2021-12-08 ASSESSMENT — ENCOUNTER SYMPTOMS
SORE THROAT: 0
NAUSEA: 0
COUGH: 0
SWOLLEN GLANDS: 0
ABDOMINAL PAIN: 0
VOMITING: 0
CHANGE IN BOWEL HABIT: 0
VISUAL CHANGE: 0

## 2021-12-08 ASSESSMENT — PATIENT HEALTH QUESTIONNAIRE - PHQ9
SUM OF ALL RESPONSES TO PHQ QUESTIONS 1-9: 0
1. LITTLE INTEREST OR PLEASURE IN DOING THINGS: 0
2. FEELING DOWN, DEPRESSED OR HOPELESS: 0
SUM OF ALL RESPONSES TO PHQ QUESTIONS 1-9: 0
SUM OF ALL RESPONSES TO PHQ9 QUESTIONS 1 & 2: 0
SUM OF ALL RESPONSES TO PHQ QUESTIONS 1-9: 0

## 2021-12-08 ASSESSMENT — SOCIAL DETERMINANTS OF HEALTH (SDOH): HOW HARD IS IT FOR YOU TO PAY FOR THE VERY BASICS LIKE FOOD, HOUSING, MEDICAL CARE, AND HEATING?: NOT HARD AT ALL

## 2021-12-08 NOTE — PROGRESS NOTES
social    Drug use: Yes     Types: Marijuana Eudelia Ivis)    Sexual activity: Not on file   Other Topics Concern    Not on file   Social History Narrative    Not on file     Social Determinants of Health     Financial Resource Strain: Low Risk     Difficulty of Paying Living Expenses: Not hard at all   Food Insecurity: No Food Insecurity    Worried About Running Out of Food in the Last Year: Never true    [address] in the Last Year: Never true   Transportation Needs:     Lack of Transportation (Medical): Not on file    Lack of Transportation (Non-Medical): Not on file   Physical Activity:     Days of Exercise per Week: Not on file    Minutes of Exercise per Session: Not on file   Stress:     Feeling of Stress : Not on file   Social Connections:     Frequency of Communication with Friends and Family: Not on file    Frequency of Social Gatherings with Friends and Family: Not on file    Attends Anabaptism Services: Not on file    Active Member of [address] [place] [address] or Organizations: Not on file    Attends Club or Organization Meetings: Not on file    Marital Status: Not on file   Intimate Partner Violence:     Fear of Current or Ex-Partner: Not on file    Emotionally Abused: Not on file    Physically Abused: Not on file    Sexually Abused: Not on file   Housing Stability:     Unable to Pay for Housing in the Last Year: Not on file    Number of [place] in the Last Year: Not on file    Unstable Housing in the Last Year: Not on file     Allergies:  Patient has no known allergies. Review of Systems   Constitutional: Negative for chills, diaphoresis, fatigue and fever. HENT: Negative for congestion and sore throat. Respiratory: Negative for cough. Cardiovascular: Negative for chest pain. Gastrointestinal: Negative for abdominal pain, anorexia, change in bowel habit, nausea and vomiting. Genitourinary: Positive for scrotal swelling.  Negative for decreased urine volume, difficulty urinating, expectations havebeen discussed with the patient who expresses understanding and desires to proceed. Pt instructions reviewed and given to patient.     Close follow up to evaluate treatment resultsand for coordination of care. I have reviewed the patient's medical historyin detail and updated the computerized patient record.     Choco Zamudio, MAHNAZ - CNP

## 2021-12-10 ENCOUNTER — HOSPITAL ENCOUNTER (OUTPATIENT)
Dept: ULTRASOUND IMAGING | Age: 31
Discharge: HOME | End: 2021-12-12
Payer: COMMERCIAL

## 2021-12-10 DIAGNOSIS — N44.2 TESTICULAR CYST: ICD-10-CM

## 2021-12-10 PROCEDURE — 76870 US EXAM SCROTUM: CPT

## 2021-12-13 DIAGNOSIS — R59.9 LYMPH NODE ENLARGEMENT: Primary | ICD-10-CM

## 2021-12-16 DIAGNOSIS — R59.9 LYMPH NODE ENLARGEMENT: ICD-10-CM

## 2021-12-16 LAB
BILIRUBIN URINE: NEGATIVE
BLOOD, URINE: NEGATIVE
CLARITY: CLEAR
COLOR: YELLOW
GLUCOSE URINE: NEGATIVE MG/DL
KETONES, URINE: NEGATIVE MG/DL
LEUKOCYTE ESTERASE, URINE: NEGATIVE
NITRITE, URINE: NEGATIVE
PH UA: 6 (ref 5–9)
PROTEIN UA: NEGATIVE MG/DL
SPECIFIC GRAVITY UA: 1.02 (ref 1–1.03)
URINE REFLEX TO CULTURE: NORMAL
UROBILINOGEN, URINE: 0.2 E.U./DL

## 2021-12-23 LAB
C. TRACHOMATIS DNA ,URINE: NEGATIVE
N. GONORRHOEAE DNA, URINE: NEGATIVE

## 2022-01-21 ENCOUNTER — TELEPHONE (OUTPATIENT)
Dept: INFECTIOUS DISEASES | Age: 32
End: 2022-01-21

## 2022-01-21 NOTE — TELEPHONE ENCOUNTER
Pt notified office that Dr Tegan Diaz is not longer in his network. Discussed that that meant with pt. Pt will reach out to insurance to determine tier and will discuss other options once pt call back with further information.

## 2022-02-08 ENCOUNTER — TELEPHONE (OUTPATIENT)
Dept: INFECTIOUS DISEASES | Age: 32
End: 2022-02-08

## 2022-02-08 NOTE — TELEPHONE ENCOUNTER
Requested supportive animal letter to be able to keep animal.   Letter completed and signed by Dr Uzair Posada previously. Letter to be mailed to pt.

## 2022-02-28 DIAGNOSIS — B20 HIV INFECTION, UNSPECIFIED SYMPTOM STATUS (HCC): Primary | ICD-10-CM

## 2022-03-16 DIAGNOSIS — B20 HIV INFECTION, UNSPECIFIED SYMPTOM STATUS (HCC): ICD-10-CM

## 2022-03-16 LAB
ALBUMIN SERPL-MCNC: 4.8 G/DL (ref 3.5–4.6)
ALP BLD-CCNC: 23 U/L (ref 35–104)
ALT SERPL-CCNC: 16 U/L (ref 0–41)
ANION GAP SERPL CALCULATED.3IONS-SCNC: 15 MEQ/L (ref 9–15)
AST SERPL-CCNC: 17 U/L (ref 0–40)
BILIRUB SERPL-MCNC: 0.4 MG/DL (ref 0.2–0.7)
BUN BLDV-MCNC: 17 MG/DL (ref 6–20)
CALCIUM SERPL-MCNC: 9.9 MG/DL (ref 8.5–9.9)
CHLORIDE BLD-SCNC: 102 MEQ/L (ref 95–107)
CO2: 24 MEQ/L (ref 20–31)
CREAT SERPL-MCNC: 1.16 MG/DL (ref 0.7–1.2)
GFR AFRICAN AMERICAN: >60
GFR NON-AFRICAN AMERICAN: >60
GLOBULIN: 3 G/DL (ref 2.3–3.5)
GLUCOSE BLD-MCNC: 92 MG/DL (ref 70–99)
HCT VFR BLD CALC: 42 % (ref 42–52)
HEMOGLOBIN: 14.3 G/DL (ref 14–18)
MCH RBC QN AUTO: 31.6 PG (ref 27–31.3)
MCHC RBC AUTO-ENTMCNC: 33.9 % (ref 33–37)
MCV RBC AUTO: 93 FL (ref 80–100)
PDW BLD-RTO: 12.8 % (ref 11.5–14.5)
PLATELET # BLD: 287 K/UL (ref 130–400)
POTASSIUM SERPL-SCNC: 4.3 MEQ/L (ref 3.4–4.9)
RBC # BLD: 4.52 M/UL (ref 4.7–6.1)
SODIUM BLD-SCNC: 141 MEQ/L (ref 135–144)
TOTAL PROTEIN: 7.8 G/DL (ref 6.3–8)
WBC # BLD: 6.2 K/UL (ref 4.8–10.8)

## 2022-03-17 LAB — RPR: NORMAL

## 2022-03-18 LAB
ABSOLUTE CD 4 HELPER: 1321 CELLS/UL (ref 430–1800)
CD4 % HELPER T CELL: 33 % (ref 32–64)
LYMPHOCYTE SUBSET PANEL 2 INFO: NORMAL

## 2022-03-23 LAB
HIV-1 QNT LOG, IU/ML: NOT DETECTED LOG CPY/ML
HIV-1 QNT, IU/ML: NOT DETECTED CPY/ML
INTERPRETATION: NOT DETECTED

## 2022-04-06 ENCOUNTER — TELEMEDICINE (OUTPATIENT)
Dept: INFECTIOUS DISEASES | Age: 32
End: 2022-04-06
Payer: COMMERCIAL

## 2022-04-06 ENCOUNTER — TELEPHONE (OUTPATIENT)
Dept: INFECTIOUS DISEASES | Age: 32
End: 2022-04-06

## 2022-04-06 DIAGNOSIS — B20 HIV INFECTION, UNSPECIFIED SYMPTOM STATUS (HCC): Primary | ICD-10-CM

## 2022-04-06 PROCEDURE — 99213 OFFICE O/P EST LOW 20 MIN: CPT | Performed by: INTERNAL MEDICINE

## 2022-04-06 NOTE — PROGRESS NOTES
Mac Becerril is a 33 y.o. male, Established patient, here for evaluation of the following chief complaint(s): No chief complaint on file. ASSESSMENT/PLAN:   Diagnosis Orders   1. HIV infection, unspecified symptom status ([address]       Reviewed lab work. CD4 count is excellent viral load suppressed. Liver kidney function seem to be doing okay at this time. We deferred health maintenance issues with lab work soon. Discussed annual hpv screening may refer him to  colorectal although it sounds like given his age group previous negative test, low yield    SUBJECTIVE/OBJECTIVE:  HPI  Patient is a virtual follow-up of for this patient regarding his HIV status patient says he is doing quite well. He is starting his medication he says soon after primary infection no issues with medications been compliant viral load has been undetectable. He has been with one sexual partner primarily orally no anal receptive intercourse for several years. He said he had one anal Pap smear in the past that was negative w/  Previous provider. He says usually gets usual dental care. He is no longer actively smoking cigarettes he will occasionally use nicotine vape pens. No other drug use history sides occasional marijuana use .   No fevers chills night sweats or weight loss    Review of Systems   See HPI      Physical Exam  [INSTRUCTIONS:  \"[x]\" Indicates a positive item  \"[]\" Indicates a negative item  -- DELETE ALL ITEMS NOT EXAMINED]    Constitutional: [x] Appears well-developed and well-nourished [x] No apparent distress      [] Abnormal -     Mental status: [x] Alert and awake  [x] Oriented to person/place/time [x] Able to follow commands    [] Abnormal -     Eyes:   EOM    [x]  Normal    [] Abnormal -   Sclera  [x]  Normal    [] Abnormal -          Discharge [x]  None visible   [] Abnormal -     HENT: [x] Normocephalic, atraumatic  [] Abnormal -   [x] Mouth/Throat: Mucous membranes are moist    External Ears [x] Normal  [] Abnormal -    Neck: [x] No visualized mass [] Abnormal -     Pulmonary/Chest: [x] Respiratory effort normal   [x] No visualized signs of difficulty breathing or respiratory distress        [] Abnormal -      Musculoskeletal:   [x] Normal gait with no signs of ataxia         [x] Normal range of motion of neck        [] Abnormal -     Neurological:        [x] No Facial Asymmetry (Cranial nerve 7 motor function) (limited exam due to video visit)          [x] No gaze palsy        [] Abnormal -          Skin:        [x] No significant exanthematous lesions or discoloration noted on facial skin         [] Abnormal -            Psychiatric:       [x] Normal Affect [] Abnormal -        [x] No Hallucinations    Other pertinent observable physical exam findings:-        On this date 4/6/2022 I have spent 24 minutes reviewing previous notes, test results and face to face (virtual) with the patient discussing the diagnosis and importance of compliance with the treatment plan as well as documenting on the day of the visit. Bry Renner, was evaluated through a synchronous (real-time) audio-video  encounter. The patient (or guardian if applicable) is aware that this is a billable  service, which includes applicable co-pays. This Virtual Visit was conducted with  patient's (and/or legal guardian's) consent. The visit was conducted pursuant to  the emergency declaration under the [address] [place] [address] authority and the [employer] and  [place] General Act. Patient identification was verified,  and a caregiver was present when appropriate. The patient was located in a  state where the provider was licensed to provide care    An electronic signature was used to authenticate this note.     --Verena Lomeli MD

## 2022-04-06 NOTE — TELEPHONE ENCOUNTER
Call placed to pt as V/V follow up. New orders reviewed-- discussed referral. Had pap in the past with [facility]. No hx HPV. Discussed compliance --- no issues     Pt has no questions regarding apt. 3-4 month follow up able to be scheduled. Discussed the need for lab work to be completed 1 week prior to next apt. Denies any other needs or acute issues that need doctor attention.

## 2022-04-28 RX ORDER — ELVITEGRAVIR, COBICISTAT, EMTRICITABINE, AND TENOFOVIR ALAFENAMIDE 150; 150; 200; 10 MG/1; MG/1; MG/1; MG/1
TABLET ORAL
Qty: 30 TABLET | Refills: 4 | Status: SHIPPED | OUTPATIENT
Start: 2022-04-28 | End: 2022-10-20

## 2022-08-01 ENCOUNTER — TELEPHONE (OUTPATIENT)
Dept: INFECTIOUS DISEASES | Age: 32
End: 2022-08-01

## 2022-08-01 DIAGNOSIS — B20 HIV INFECTION, UNSPECIFIED SYMPTOM STATUS (HCC): Primary | ICD-10-CM

## 2022-08-01 DIAGNOSIS — E55.9 VITAMIN D DEFICIENCY: ICD-10-CM

## 2022-08-01 NOTE — TELEPHONE ENCOUNTER
Pt noted upcoming apt. Reviewed appt date and time. Denies issues with appt day. Will complete labs this week. Denies other needs or concerns that needs to be addressed.

## 2022-08-12 DIAGNOSIS — B20 HIV INFECTION, UNSPECIFIED SYMPTOM STATUS (HCC): ICD-10-CM

## 2022-08-12 DIAGNOSIS — E55.9 VITAMIN D DEFICIENCY: ICD-10-CM

## 2022-08-12 LAB
ALBUMIN SERPL-MCNC: 4.9 G/DL (ref 3.5–4.6)
ALP BLD-CCNC: 19 U/L (ref 35–104)
ALT SERPL-CCNC: 15 U/L (ref 0–41)
ANION GAP SERPL CALCULATED.3IONS-SCNC: 12 MEQ/L (ref 9–15)
AST SERPL-CCNC: 14 U/L (ref 0–40)
BILIRUB SERPL-MCNC: 0.4 MG/DL (ref 0.2–0.7)
BUN BLDV-MCNC: 24 MG/DL (ref 6–20)
CALCIUM SERPL-MCNC: 10 MG/DL (ref 8.5–9.9)
CHLORIDE BLD-SCNC: 106 MEQ/L (ref 95–107)
CHOLESTEROL, TOTAL: 185 MG/DL (ref 0–199)
CO2: 23 MEQ/L (ref 20–31)
CREAT SERPL-MCNC: 1.22 MG/DL (ref 0.7–1.2)
GFR AFRICAN AMERICAN: >60
GFR NON-AFRICAN AMERICAN: >60
GLOBULIN: 2.8 G/DL (ref 2.3–3.5)
GLUCOSE BLD-MCNC: 86 MG/DL (ref 70–99)
HCT VFR BLD CALC: 41.1 % (ref 42–52)
HDLC SERPL-MCNC: 45 MG/DL (ref 40–59)
HEMOGLOBIN: 14.3 G/DL (ref 14–18)
LDL CHOLESTEROL CALCULATED: 93 MG/DL (ref 0–129)
MCH RBC QN AUTO: 32.7 PG (ref 27–31.3)
MCHC RBC AUTO-ENTMCNC: 34.8 % (ref 33–37)
MCV RBC AUTO: 94 FL (ref 80–100)
PDW BLD-RTO: 12.5 % (ref 11.5–14.5)
PLATELET # BLD: 230 K/UL (ref 130–400)
POTASSIUM SERPL-SCNC: 4.3 MEQ/L (ref 3.4–4.9)
RBC # BLD: 4.38 M/UL (ref 4.7–6.1)
SODIUM BLD-SCNC: 141 MEQ/L (ref 135–144)
TOTAL PROTEIN: 7.7 G/DL (ref 6.3–8)
TRIGL SERPL-MCNC: 235 MG/DL (ref 0–150)
WBC # BLD: 5.9 K/UL (ref 4.8–10.8)

## 2022-08-13 LAB — VITAMIN D 25-HYDROXY: 28.3 NG/ML

## 2022-08-14 LAB
HIV-1 RNA BY PCR, QN: NOT DETECTED
SOURCE: NORMAL

## 2022-08-15 LAB
ABSOLUTE CD 4 HELPER: 1031 CELLS/UL (ref 430–1800)
CD4 % HELPER T CELL: 36 % (ref 32–64)
LYMPHOCYTE SUBSET PANEL 2 INFO: NORMAL

## 2022-08-18 ENCOUNTER — OFFICE VISIT (OUTPATIENT)
Dept: INFECTIOUS DISEASES | Age: 32
End: 2022-08-18
Payer: COMMERCIAL

## 2022-08-18 VITALS
BODY MASS INDEX: 29.19 KG/M2 | TEMPERATURE: 98.6 F | WEIGHT: 192 LBS | HEART RATE: 80 BPM | SYSTOLIC BLOOD PRESSURE: 114 MMHG | DIASTOLIC BLOOD PRESSURE: 74 MMHG

## 2022-08-18 DIAGNOSIS — B20 HIV INFECTION, UNSPECIFIED SYMPTOM STATUS (HCC): Primary | ICD-10-CM

## 2022-08-18 PROCEDURE — 99213 OFFICE O/P EST LOW 20 MIN: CPT | Performed by: INTERNAL MEDICINE

## 2022-08-18 ASSESSMENT — ENCOUNTER SYMPTOMS
RESPIRATORY NEGATIVE: 1
GASTROINTESTINAL NEGATIVE: 1

## 2022-08-18 NOTE — PROGRESS NOTES
Routine b20 follow up  Doing good  No issues with meds. Denies any medical concerns. Work is much better now. Needs assessed. 1 food and 2 gas cards given to pt via [facility]      Denies any needs at this time. Denies any issues that need Doctor attention.  Will call with concerns

## 2022-08-18 NOTE — PROGRESS NOTES
Infectious Disease     Patient Name: Raf Peterson  Date: 2022  YOB: 1990  Medical Record Number: [id number]             HIV  Genvoya (Elvitegravir-cobicistat-emtricitabine-tenofovir alafenamide)      Component Ref Range & Units 22 1719 3/16/22 1722 21 1717 21 1219   Lymphocyte Subset Panel 2 Info  See Note  See Note CM  See Note CM  See Note CM       CD4 % [place] T Cell 32 - 64 % 36  33  33  34    Absolute CD 4 [place] 430 - 1800 cells/uL 1031  1321  625  804    Resulting Agency  [facility]       Component Ref Range & Units 22 1719 20 0938   Source  PLASMA  OP swab    HIV-1 RNA by PCR, Qn NOTDET Not Detected           Review of Systems   Constitutional: Negative. Respiratory: Negative. Cardiovascular: Negative. Gastrointestinal: Negative. Genitourinary: Negative. Musculoskeletal: Negative. Neurological: Negative. Review of Systems: All 14 review of systems negative other than as stated above    Social History     Tobacco Use    Smoking status: Former     Types: Cigarettes     Quit date: 2021     Years since quittin.0    Smokeless tobacco: Never   Vaping Use    Vaping Use: Every day    Substances: Nicotine   Substance Use Topics    Alcohol use: Yes     Comment: social    Drug use: Yes     Types: Marijuana Lakshmi Ankush)         Past Medical History:   Diagnosis Date    HIV (human immunodeficiency virus infection) ([facility] [address]            Past Surgical History:   Procedure Laterality Date    KNEE CARTILAGE SURGERY Left          Current Outpatient Medications on File Prior to Visit   Medication Sig Dispense Refill    GENVOYA [phone] MG TABLET TAKE 1 TABLET BY MOUTH EVERY DAY 30 tablet 4    GENVOYA [phone] MG TABLET Take 1 tablet by mouth daily       No current facility-administered medications on file prior to visit.        No Known Allergies      Family History   Problem Relation Age of Onset    Lupus Mother     No Known Problems Father     Diabetes Maternal Grandmother     Diabetes type 2  Maternal Grandmother          Physical Exam:      Physical Exam  Constitutional:       General: He is not in acute distress. Appearance: Normal appearance. He is not ill-appearing. HENT:      Head: Normocephalic and atraumatic. Cardiovascular:      Heart sounds: Normal heart sounds. No murmur heard. Pulmonary:      Effort: Pulmonary effort is normal. No respiratory distress. Breath sounds: Normal breath sounds. No stridor. No wheezing, rhonchi or rales. Chest:      Chest wall: No tenderness. Abdominal:      General: Abdomen is flat. Bowel sounds are normal. There is no distension. Palpations: There is no mass. Tenderness: There is no abdominal tenderness. There is no guarding or rebound. Hernia: No hernia is present. Neurological:      Mental Status: He is alert. There were no vitals taken for this visit.       .   Lab Results   Component Value Date    WBC 5.9 08/12/2022    HGB 14.3 08/12/2022    HCT 41.1 (L) 08/12/2022    MCV 94.0 08/12/2022     08/12/2022     Lab Results   Component Value Date/Time     08/12/2022 05:19 PM    K 4.3 08/12/2022 05:19 PM     08/12/2022 05:19 PM    CO2 23 08/12/2022 05:19 PM    BUN 24 08/12/2022 05:19 PM    CREATININE 1.22 08/12/2022 05:19 PM    GLUCOSE 86 08/12/2022 05:19 PM    CALCIUM 10.0 08/12/2022 05:19 PM            ASSESSMENT:  PLAN:      Asymptomatic HIV    Genvoya (Elvitegravir-cobicistat-emtricitabine-tenofovir alafenamide)

## 2022-10-20 RX ORDER — ELVITEGRAVIR, COBICISTAT, EMTRICITABINE, AND TENOFOVIR ALAFENAMIDE 150; 150; 200; 10 MG/1; MG/1; MG/1; MG/1
TABLET ORAL
Qty: 30 TABLET | Refills: 4 | Status: SHIPPED | OUTPATIENT
Start: 2022-10-20

## 2022-10-24 ENCOUNTER — TELEPHONE (OUTPATIENT)
Dept: INFECTIOUS DISEASES | Age: 32
End: 2022-10-24

## 2022-10-24 NOTE — TELEPHONE ENCOUNTER
Pt returned call. Discussed need for [facility] and updates. he is familiar with RW program. Denied questions regarding paperwork. He will provide updated income verification. Pt denies any other changes to residency or insurance.    [facility] apt scheduled for 10.31.22   Apt is scheduled for phone or in office    Will call office if needed

## 2022-10-24 NOTE — TELEPHONE ENCOUNTER
Call placed to pt regarding need to update RW eligibility and paperwork. Message left for pt to call back and schedule apt.

## 2022-11-04 DIAGNOSIS — B20 HIV INFECTION, UNSPECIFIED SYMPTOM STATUS (HCC): Primary | ICD-10-CM

## 2022-11-04 DIAGNOSIS — E55.9 VITAMIN D DEFICIENCY: ICD-10-CM

## 2022-11-08 ENCOUNTER — NURSE ONLY (OUTPATIENT)
Dept: INFECTIOUS DISEASES | Age: 32
End: 2022-11-08

## 2023-01-26 ENCOUNTER — TELEPHONE (OUTPATIENT)
Dept: INFECTIOUS DISEASES | Age: 33
End: 2023-01-26

## 2023-02-03 DIAGNOSIS — E55.9 VITAMIN D DEFICIENCY: ICD-10-CM

## 2023-02-03 DIAGNOSIS — B20 HIV INFECTION, UNSPECIFIED SYMPTOM STATUS (HCC): ICD-10-CM

## 2023-02-03 LAB
ALBUMIN SERPL-MCNC: 4.4 G/DL (ref 3.5–4.6)
ALP BLD-CCNC: 21 U/L (ref 35–104)
ALT SERPL-CCNC: 18 U/L (ref 0–41)
ANION GAP SERPL CALCULATED.3IONS-SCNC: 10 MEQ/L (ref 9–15)
AST SERPL-CCNC: 19 U/L (ref 0–40)
BILIRUB SERPL-MCNC: 0.4 MG/DL (ref 0.2–0.7)
BUN BLDV-MCNC: 20 MG/DL (ref 6–20)
CALCIUM SERPL-MCNC: 9.3 MG/DL (ref 8.5–9.9)
CHLORIDE BLD-SCNC: 105 MEQ/L (ref 95–107)
CO2: 30 MEQ/L (ref 20–31)
CREAT SERPL-MCNC: 1.17 MG/DL (ref 0.7–1.2)
GFR SERPL CREATININE-BSD FRML MDRD: >60 ML/MIN/{1.73_M2}
GLOBULIN: 2.6 G/DL (ref 2.3–3.5)
GLUCOSE BLD-MCNC: 75 MG/DL (ref 70–99)
HCT VFR BLD CALC: 41.8 % (ref 42–52)
HEMOGLOBIN: 14.3 G/DL (ref 14–18)
MCH RBC QN AUTO: 31.8 PG (ref 27–31.3)
MCHC RBC AUTO-ENTMCNC: 34.2 % (ref 33–37)
MCV RBC AUTO: 92.9 FL (ref 79–92.2)
PDW BLD-RTO: 12.5 % (ref 11.5–14.5)
PLATELET # BLD: 238 K/UL (ref 130–400)
POTASSIUM SERPL-SCNC: 4.1 MEQ/L (ref 3.4–4.9)
RBC # BLD: 4.5 M/UL (ref 4.7–6.1)
SODIUM BLD-SCNC: 145 MEQ/L (ref 135–144)
TOTAL PROTEIN: 7 G/DL (ref 6.3–8)
WBC # BLD: 6.4 K/UL (ref 4.8–10.8)

## 2023-02-04 LAB — VITAMIN D 25-HYDROXY: 16.4 NG/ML

## 2023-02-05 LAB
ABSOLUTE CD 4 HELPER: 990 CELLS/UL (ref 430–1800)
CD4 % HELPER T CELL: 37 % (ref 32–64)
LYMPHOCYTE SUBSET PANEL 2 INFO: NORMAL

## 2023-02-10 ENCOUNTER — OFFICE VISIT (OUTPATIENT)
Dept: INFECTIOUS DISEASES | Age: 33
End: 2023-02-10

## 2023-02-10 VITALS
DIASTOLIC BLOOD PRESSURE: 68 MMHG | SYSTOLIC BLOOD PRESSURE: 102 MMHG | WEIGHT: 194 LBS | HEART RATE: 78 BPM | TEMPERATURE: 98.6 F | BODY MASS INDEX: 29.5 KG/M2

## 2023-02-10 DIAGNOSIS — B20 HIV INFECTION, UNSPECIFIED SYMPTOM STATUS (HCC): Primary | ICD-10-CM

## 2023-02-10 ASSESSMENT — PATIENT HEALTH QUESTIONNAIRE - PHQ9
SUM OF ALL RESPONSES TO PHQ QUESTIONS 1-9: 0
SUM OF ALL RESPONSES TO PHQ QUESTIONS 1-9: 0
SUM OF ALL RESPONSES TO PHQ9 QUESTIONS 1 & 2: 0
1. LITTLE INTEREST OR PLEASURE IN DOING THINGS: 0
SUM OF ALL RESPONSES TO PHQ QUESTIONS 1-9: 0
2. FEELING DOWN, DEPRESSED OR HOPELESS: 0
SUM OF ALL RESPONSES TO PHQ QUESTIONS 1-9: 0

## 2023-02-10 ASSESSMENT — ENCOUNTER SYMPTOMS
RESPIRATORY NEGATIVE: 1
GASTROINTESTINAL NEGATIVE: 1

## 2023-02-10 NOTE — PROGRESS NOTES
Pt here for routine B20 appointment. Doing well. Compliant with medications? Yes. States he only misses \"maybe 1 or 2 time a month\"    Flu vaccine? had    Pneumonia vacc? No    Hep B? Uncertain     Smoking? Vape - working on quitting     Etoh? Occasionally     Drugs? Debi Dance. PCP? yes    Working? yes    Housing? stable    Dental? Goes twice a year to Dr. Yan MALDONADO: n/a    Eligibility date:  [facility] policy date:       U=U and mental health were both discussed at today's appointment. Pt denies any issues with mental health, and verbalizes understanding of U=U. Pt to contact office with any questions or concerns. Phone number to office provided at checkout.

## 2023-02-10 NOTE — PROGRESS NOTES
Infectious Disease     Patient Name: Sarah Steward  Date: 2/10/2023  YOB: 1990  Medical Record Number: [id number]             HIV  Genvoya (Elvitegravir-cobicistat-emtricitabine-tenofovir alafenamide)      Component Ref Range & Units 2/3/23 1546 22 1719 3/16/22 1722 21 1717 21 1219   Lymphocyte Subset Panel 2 Info  See Note  See Note CM  See Note CM  See Note CM  See Note CM       CD4 % [place] T Cell 32 - 64 % 37  36  33  33  34    Absolute CD 4 [place] 430 - 1800 cells/uL 990  1031  1321  625  804        Component Ref Range & Units 22 1719 20 0938   Source  PLASMA  OP swab    HIV-1 RNA by PCR, Qn NOTDET Not Detected           Review of Systems   Constitutional: Negative. Respiratory: Negative. Cardiovascular: Negative. Gastrointestinal: Negative. Genitourinary: Negative. Musculoskeletal: Negative. Neurological: Negative. Review of Systems: All 14 review of systems negative other than as stated above    Social History     Tobacco Use    Smoking status: Former     Types: Cigarettes     Quit date: 2021     Years since quittin.5    Smokeless tobacco: Never   Vaping Use    Vaping Use: Every day    Substances: Nicotine   Substance Use Topics    Alcohol use: Yes     Comment: social    Drug use: Yes     Types: Marijuana Reed Anderson)         Past Medical History:   Diagnosis Date    HIV (human immunodeficiency virus infection) ([facility] [address]            Past Surgical History:   Procedure Laterality Date    KNEE CARTILAGE SURGERY Left          Current Outpatient Medications on File Prior to Visit   Medication Sig Dispense Refill    GENVOYA [phone] MG TABLET TAKE 1 TABLET BY MOUTH EVERY DAY 30 tablet 4    GENVOYA [phone] MG TABLET Take 1 tablet by mouth daily       No current facility-administered medications on file prior to visit.        No Known Allergies      Family History   Problem Relation Age of Onset    Lupus Mother     No Known Problems Father     Diabetes Maternal Grandmother     Diabetes type 2  Maternal Grandmother          Physical Exam:      Physical Exam  Constitutional:       General: He is not in acute distress. Appearance: Normal appearance. He is not ill-appearing. HENT:      Head: Normocephalic and atraumatic. Cardiovascular:      Heart sounds: Normal heart sounds. No murmur heard. Pulmonary:      Effort: Pulmonary effort is normal. No respiratory distress. Breath sounds: Normal breath sounds. No stridor. No wheezing, rhonchi or rales. Chest:      Chest wall: No tenderness. Abdominal:      General: Abdomen is flat. Bowel sounds are normal. There is no distension. Palpations: There is no mass. Tenderness: There is no abdominal tenderness. There is no guarding or rebound. Hernia: No hernia is present. Neurological:      Mental Status: He is alert. /68   Pulse 78   Temp 98.6 °F (37 °C)   Wt 194 lb (88 kg)   BMI 29.50 kg/m²       .    Lab Results   Component Value Date    WBC 6.4 02/03/2023    HGB 14.3 02/03/2023    HCT 41.8 (L) 02/03/2023    MCV 92.9 (H) 02/03/2023     02/03/2023     Lab Results   Component Value Date/Time     02/03/2023 03:46 PM    K 4.1 02/03/2023 03:46 PM     02/03/2023 03:46 PM    CO2 30 02/03/2023 03:46 PM    BUN 20 02/03/2023 03:46 PM    CREATININE 1.17 02/03/2023 03:46 PM    GLUCOSE 75 02/03/2023 03:46 PM    CALCIUM 9.3 02/03/2023 03:46 PM            ASSESSMENT:  PLAN:      Asymptomatic HIV    Genvoya (Elvitegravir-cobicistat-emtricitabine-tenofovir alafenamide)

## 2023-02-10 NOTE — PROGRESS NOTES
Routine b20 apt  Doing well  Recently having housing issues with roommate, has moved and is in a better situation now. Provided active listening and support. Looking for new job  No med issues  Hep B immune   Discussed needed pneumonia vaccine -- reviewed pt can obtain vaccine from local pharmacy/PCP or Health dept. Reviewed importance. Pt is agreeable. States he received flu vaccine.    Vapes- trying to cut back  Denies other issues right now and will call office if needed

## 2023-02-12 LAB
HIV-1 RNA BY PCR, QN: NOT DETECTED
SOURCE: NORMAL

## 2023-03-06 ENCOUNTER — HOSPITAL ENCOUNTER (OUTPATIENT)
Dept: GENERAL RADIOLOGY | Age: 33
Discharge: HOME | End: 2023-03-08
Payer: COMMERCIAL

## 2023-03-06 ENCOUNTER — TELEPHONE (OUTPATIENT)
Dept: INFECTIOUS DISEASES | Age: 33
End: 2023-03-06

## 2023-03-06 ENCOUNTER — OFFICE VISIT (OUTPATIENT)
Dept: FAMILY MEDICINE CLINIC | Age: 33
End: 2023-03-06
Payer: COMMERCIAL

## 2023-03-06 VITALS
DIASTOLIC BLOOD PRESSURE: 62 MMHG | SYSTOLIC BLOOD PRESSURE: 118 MMHG | WEIGHT: 198 LBS | TEMPERATURE: 96.8 F | HEIGHT: 68 IN | HEART RATE: 64 BPM | OXYGEN SATURATION: 97 % | BODY MASS INDEX: 30.01 KG/M2

## 2023-03-06 DIAGNOSIS — M25.532 WRIST PAIN, ACUTE, LEFT: ICD-10-CM

## 2023-03-06 DIAGNOSIS — M25.532 WRIST PAIN, ACUTE, LEFT: Primary | ICD-10-CM

## 2023-03-06 PROCEDURE — 73110 X-RAY EXAM OF WRIST: CPT

## 2023-03-06 PROCEDURE — 99213 OFFICE O/P EST LOW 20 MIN: CPT | Performed by: NURSE PRACTITIONER

## 2023-03-06 RX ORDER — PREDNISONE 50 MG/1
50 TABLET ORAL DAILY
Qty: 5 TABLET | Refills: 0 | Status: SHIPPED | OUTPATIENT
Start: 2023-03-06 | End: 2023-03-11

## 2023-03-06 SDOH — ECONOMIC STABILITY: INCOME INSECURITY: HOW HARD IS IT FOR YOU TO PAY FOR THE VERY BASICS LIKE FOOD, HOUSING, MEDICAL CARE, AND HEATING?: NOT HARD AT ALL

## 2023-03-06 SDOH — ECONOMIC STABILITY: FOOD INSECURITY: WITHIN THE PAST 12 MONTHS, YOU WORRIED THAT YOUR FOOD WOULD RUN OUT BEFORE YOU GOT MONEY TO BUY MORE.: NEVER TRUE

## 2023-03-06 SDOH — ECONOMIC STABILITY: HOUSING INSECURITY
IN THE LAST 12 MONTHS, WAS THERE A TIME WHEN YOU DID NOT HAVE A STEADY PLACE TO SLEEP OR SLEPT IN A SHELTER (INCLUDING NOW)?: NO

## 2023-03-06 SDOH — ECONOMIC STABILITY: FOOD INSECURITY: WITHIN THE PAST 12 MONTHS, THE FOOD YOU BOUGHT JUST DIDN'T LAST AND YOU DIDN'T HAVE MONEY TO GET MORE.: NEVER TRUE

## 2023-03-06 ASSESSMENT — ENCOUNTER SYMPTOMS
COUGH: 0
TROUBLE SWALLOWING: 0
DIARRHEA: 0
SORE THROAT: 0
NAUSEA: 0
SHORTNESS OF BREATH: 0
RHINORRHEA: 0
WHEEZING: 0
VOMITING: 0

## 2023-03-06 NOTE — PROGRESS NOTES
Subjective:      Patient ID: Trudi Gonsalez is a 27 y.o. male who presents today for:  Chief Complaint   Patient presents with    Wrist Injury     Wrist  pain, swollen  start 4 days ago        HPI    About 3 weeks ago, He was playing volleyball and hurt the pinky of the left hand but not bad, didn't think anything of it     Thursday this started-woke up in the morning and was like wrist feels tight to makea fist   He does play volleyball  He played on Thursday   Every day gotten worse   Its not constant pain   Right now not in pain but if he tries to use it it will hurt   At home took aleve and topical oint  Voltaren   Icing it throughout the day   Hes wearing a brace     Cant bend the wrist backwards  Tight   Wrist   Ring finger to the wrist on the underside   Feels tight   Really doesn't look swollen  Hard time making a fist because swollen     Hes rigth handed     He had broke  this wrist when he was 10, did not have surgery   Past Medical History:   Diagnosis Date    HIV (human immunodeficiency virus infection) ([facility] [address]      Past Surgical History:   Procedure Laterality Date    KNEE CARTILAGE SURGERY Left      Social History     Socioeconomic History    Marital status: Unknown     Spouse name: Not on file    Number of children: Not on file    Years of education: Not on file    Highest education level: Not on file   Occupational History    Not on file   Tobacco Use    Smoking status: Former     Types: Cigarettes     Quit date: 2021     Years since quittin.6    Smokeless tobacco: Never   Vaping Use    Vaping Use: Every day    Substances: Nicotine   Substance and Sexual Activity    Alcohol use: Yes     Comment: social    Drug use: Yes     Types: Marijuana Cristin Postin)    Sexual activity: Not on file   Other Topics Concern    Not on file   Social History Narrative    Not on file     Social Determinants of Health     Financial Resource Strain: Low Risk     Difficulty of Paying Living Expenses: Not hard at all Food Insecurity: No Food Insecurity    Worried About Running Out of Food in the Last Year: Never true    Ran Out of Food in the Last Year: Never true   Transportation Needs: Unknown    Lack of Transportation (Medical): Not on file    Lack of Transportation (Non-Medical): No   Physical Activity: Not on file   Stress: Not on file   Social Connections: Not on file   Intimate Partner Violence: Not on file   Housing Stability: Unknown    Unable to Pay for Housing in the Last Year: Not on file    Number of Places Lived in the Last Year: Not on file    Unstable Housing in the Last Year: No     Family History   Problem Relation Age of Onset    Lupus Mother     No Known Problems Father     Diabetes Maternal Grandmother     Diabetes type 2  Maternal Grandmother      No Known Allergies  Current Outpatient Medications   Medication Sig Dispense Refill    predniSONE (DELTASONE) 50 MG tablet Take 1 tablet by mouth daily for 5 days 5 tablet 0    GENVOYA [phone] MG TABLET Take 1 tablet by mouth daily      GENVOYA [phone] MG TABLET TAKE 1 TABLET BY MOUTH EVERY DAY (Patient not taking: Reported on 3/6/2023) 30 tablet 4     No current facility-administered medications for this visit. Review of Systems   Constitutional:  Negative for chills, fatigue and fever. HENT:  Negative for congestion, rhinorrhea, sore throat and trouble swallowing. Respiratory:  Negative for cough, shortness of breath and wheezing. Gastrointestinal:  Negative for diarrhea, nausea and vomiting. Musculoskeletal:  Positive for arthralgias. Negative for myalgias. Skin:  Negative for rash. Neurological:  Negative for dizziness, light-headedness and headaches. Psychiatric/Behavioral:  Negative for agitation, confusion and hallucinations. Objective:   /62   Pulse 64   Temp 96.8 °F (36 °C)   Ht 5' 8\" (1.727 m)   Wt 198 lb (89.8 kg)   SpO2 97%   BMI 30.11 kg/m²     Physical Exam  Vitals reviewed.    Constitutional: Appearance: Normal appearance. HENT:      Head: Normocephalic and atraumatic. Nose: Nose normal.      Mouth/Throat:      Lips: Pink. Mouth: Mucous membranes are moist.   Eyes:      General: Lids are normal.      Conjunctiva/sclera: Conjunctivae normal.   Cardiovascular:      Rate and Rhythm: Normal rate. Pulmonary:      Effort: Pulmonary effort is normal.   Abdominal:      Tenderness: There is no guarding. Musculoskeletal:      Left wrist: No swelling or snuff box tenderness. Decreased range of motion. Normal pulse. Arms:       Cervical back: Normal range of motion. Comments: Can bend the wrist inward but cannot extend it at all, really not swollen, is tender to touch, hard for him to make a first and do thumb finger thumb opposition, hand and fingers are fine but hard to do things because triggers pain in the wrist or feels tight    Skin:     General: Skin is warm and dry. Neurological:      General: No focal deficit present. Mental Status: He is alert and oriented to person, place, and time. Psychiatric:         Mood and Affect: Mood normal.         Behavior: Behavior normal. Behavior is cooperative. Assessment:       Diagnosis Orders   1. Wrist pain, acute, left  XR WRIST LEFT (MIN 3 VIEWS)    predniSONE (DELTASONE) 50 MG tablet    [address] [place] [address] [facility]        No results found for this visit on 03/06/23. Plan:   Continue to wear brace, after finish prednisone take NSADI routinely, follow up with ortho if continues to hurt due to ulnar positive variance. Assessment & Plan   Brittny Corea was seen today for wrist injury. Diagnoses and all orders for this visit:    Wrist pain, acute, left  -     XR WRIST LEFT (MIN 3 VIEWS); Future  -     predniSONE (DELTASONE) 50 MG tablet;  Take 1 tablet by mouth daily for 5 days  -     [facility], [address] [place] [address] [facility]    Orders Placed This Encounter Procedures    XR WRIST LEFT (MIN 3 VIEWS)     Standing Status:   Future     Number of Occurrences:   1     Standing Expiration Date:   3/6/2024     Order Specific Question:   Reason for exam:     Answer:   injury    [address], [address] [facility], [facility]     Referral Priority:   Routine     Referral Type:   Surgical     Referral Reason:   Specialty Services Required     Referred to Provider:   Sonam Bowser DO     Requested Specialty:   Orthopaedic Surgery     Number of Visits Requested:   1     Orders Placed This Encounter   Medications    predniSONE (DELTASONE) 50 MG tablet     Sig: Take 1 tablet by mouth daily for 5 days     Dispense:  5 tablet     Refill:  0     There are no discontinued medications. Return if symptoms worsen or fail to improve. Reviewed with the patient/family: current clinical status & medications. Side effects of the medication prescribed today, as well as treatment plan/rationale and result expectations have been discussed with the patient/family who expresses understanding. Patient will be discharged home in stable condition. Follow up with PCP to evaluate treatment results or return if symptoms worsen or fail to improve. Discussed signs and symptoms which require immediate follow-up in ED/call to 911. Understanding verbalized. I have reviewed the patient's medical history in detail and updated the computerized patient record.     MAHNAZ Gerber - CNP

## 2023-03-06 NOTE — TELEPHONE ENCOUNTER
Pt update. Went to walk in clinic. Wrist concerns. Will see ortho. States he may need surgery. Requested he call with other updates.

## 2023-03-21 ENCOUNTER — OFFICE VISIT (OUTPATIENT)
Dept: ORTHOPEDIC SURGERY | Age: 33
End: 2023-03-21

## 2023-03-21 VITALS
TEMPERATURE: 98.4 F | BODY MASS INDEX: 30.01 KG/M2 | OXYGEN SATURATION: 98 % | HEIGHT: 68 IN | HEART RATE: 83 BPM | WEIGHT: 198 LBS

## 2023-03-21 DIAGNOSIS — R52 POSTTRAUMATIC PAIN: Primary | ICD-10-CM

## 2023-03-21 NOTE — PROGRESS NOTES
[facility]      Subjective:      Chief Complaint   Patient presents with    New Patient     Patient presents with right wrist pain. Macrina Jackson states he may have jammed it a volleyball 5 weeks ago. He's been having pain and swelling on and off ever since. He did take a medrol dose pack which did help. HPI: Darinel Long is a 27 y.o. male who is here for chronic wrist pain at the left side. He has notable history of a Stuart fracture that was not operated on when he was in his teens. There has been no recent trauma to the area however at volleyball which he plays a lot of aggravates his symptoms. His pain is mainly on the outside portion/ulnar area of the wrist.  Whenever the pain occurs he has difficulties with motion and  strength. He has not tried any bracing, therapy. He did just finish a Dosepak of steroid which did provide him relief. His symptoms are much improved ever since then.     Past Medical History:   Diagnosis Date    HIV (human immunodeficiency virus infection) ([facility] [address]       Past Surgical History:   Procedure Laterality Date    KNEE CARTILAGE SURGERY Left      Social History     Socioeconomic History    Marital status: Unknown     Spouse name: Not on file    Number of children: Not on file    Years of education: Not on file    Highest education level: Not on file   Occupational History    Not on file   Tobacco Use    Smoking status: Former     Types: Cigarettes     Quit date: 2021     Years since quittin.6    Smokeless tobacco: Never   Vaping Use    Vaping Use: Every day    Substances: Nicotine   Substance and Sexual Activity    Alcohol use: Yes     Comment: social    Drug use: Yes     Types: Marijuana Velvet Baller)    Sexual activity: Not on file   Other Topics Concern    Not on file   Social History Narrative    Not on file     Social Determinants of Health     Financial Resource Strain: Low Risk     Difficulty of Paying Living Expenses: Not hard

## 2023-05-18 RX ORDER — ELVITEGRAVIR, COBICISTAT, EMTRICITABINE, AND TENOFOVIR ALAFENAMIDE 150; 150; 200; 10 MG/1; MG/1; MG/1; MG/1
TABLET ORAL
Qty: 30 TABLET | Refills: 1 | Status: SHIPPED | OUTPATIENT
Start: 2023-05-18

## 2023-06-30 ENCOUNTER — TELEPHONE (OUTPATIENT)
Dept: INFECTIOUS DISEASES | Age: 33
End: 2023-06-30

## 2023-06-30 DIAGNOSIS — B20 HIV INFECTION, UNSPECIFIED SYMPTOM STATUS (HCC): Primary | ICD-10-CM

## 2023-07-05 ENCOUNTER — TELEPHONE (OUTPATIENT)
Dept: INFECTIOUS DISEASES | Age: 33
End: 2023-07-05

## 2023-07-05 NOTE — TELEPHONE ENCOUNTER
Pt noted change in job. Lost insurance as of 6.30.23  Has about half (15 pills) of Carmen Gobble at this time. Training at new job all next week. Discussed need for CM apt to assist with insurance or needed ODAP. He did note that he will also look at [employer]. Discussed for income proof as well. Pt will provide all information regarding insurance changes as well. CM apt scheduled based on pt work schedule. 7.17. 74\ZRD phone

## 2023-07-17 ENCOUNTER — NURSE ONLY (OUTPATIENT)
Dept: INFECTIOUS DISEASES | Age: 33
End: 2023-07-17

## 2023-07-17 NOTE — PROGRESS NOTES
Appointment completed via phone. CM called pt for scheduled appointment. Pt recently obtained new employment, has no insurance. CM provided information regarding [employer] to apply for medical coverage. Pt will apply for insurance. Pt to schedule appointment with this CM to assist with OHDAP application for premium and medication co-pay assistance. CM completed Semi-Annual Review. CM noted change in insurance and income. CM updated ISP, pt agreed with POC. Pt currently has medications at pharmacy to . If medications are cost prohibitive, CM will assist with PAP for assistance. CM will follow up with pt as needed.

## 2023-07-19 ENCOUNTER — TELEPHONE (OUTPATIENT)
Dept: INFECTIOUS DISEASES | Age: 33
End: 2023-07-19

## 2023-07-19 NOTE — TELEPHONE ENCOUNTER
Call placed to pt as appt reminder. He also needs lab work completed. Pt is working on enrolling for insurance. Prefers to reschedule MD [place] and get labs next month when he has insurance. We discussed RW but he prefers to have insurance active. He is to call if he has issues obtaining medications. Pt unable to complete labs or make appt. Pt was rescheduled 8.16.23 at 10    Denies other needs or concerns that Dr Quique Ward  needs to address.

## 2023-07-21 ENCOUNTER — TELEPHONE (OUTPATIENT)
Dept: INFECTIOUS DISEASES | Age: 33
End: 2023-07-21

## 2023-07-21 NOTE — TELEPHONE ENCOUNTER
Upon reviewing provided income verification, it was determined that pt is not eligible for RW Part services based on income. Pt earns over $72,000/year. Further, pt will not be eligible for [employer] since the income cap is $54,360 per individual with no dependents. Pt informed of same. CM will provide assistance in obtaining appropriate insurance coverage as needed.

## 2023-07-25 ENCOUNTER — TELEPHONE (OUTPATIENT)
Dept: INFECTIOUS DISEASES | Age: 33
End: 2023-07-25

## 2023-07-25 NOTE — TELEPHONE ENCOUNTER
Pt called in still having insurance concerns. Still looking into insurances. 1 pill left. Discussed importance of this follow up. He is over income for most assistance programs. Discussed attempting [place] access application as he has no active insurance at this time. He will go online and apply. He will call right away if he is able to obtain medications. He will look into insurance today. He verbalized understanding of the importance.

## 2023-07-26 ENCOUNTER — TELEPHONE (OUTPATIENT)
Dept: INFECTIOUS DISEASES | Age: 33
End: 2023-07-26

## 2023-07-26 RX ORDER — ELVITEGRAVIR, COBICISTAT, EMTRICITABINE, AND TENOFOVIR ALAFENAMIDE 150; 150; 200; 10 MG/1; MG/1; MG/1; MG/1
1 TABLET ORAL DAILY
Qty: 30 TABLET | Refills: 3 | Status: SHIPPED | OUTPATIENT
Start: 2023-07-26

## 2023-07-26 NOTE — TELEPHONE ENCOUNTER
Pt completed [place] PAP. Was able to obtain coverage card for 30 days. Lacy Day script sent to requested pharmacy. [address] [place] [address] on [address]    Pt updated and will call if issues arise        BIN: [id number]  PCN: [id number]  GRP: [id number]  Member ID: [id number]

## 2023-08-10 ENCOUNTER — NURSE ONLY (OUTPATIENT)
Dept: INFECTIOUS DISEASES | Age: 33
End: 2023-08-10

## 2023-08-10 NOTE — PROGRESS NOTES
Pt presented for scheduled appointment with CM. CM assisted pt with Marketplace to obtain medical coverage. Upon reviewing options, pt decided to explore the cost of COBRA coverage. Pt will determine what route will be more affordable. CM will provide further assistance. CM did inform pt that he may receive assistance with medication co-pays via a [facility]. CM will follow up with pt as needed.

## 2023-08-31 ENCOUNTER — TELEPHONE (OUTPATIENT)
Dept: INFECTIOUS DISEASES | Age: 33
End: 2023-08-31

## 2023-08-31 RX ORDER — ELVITEGRAVIR, COBICISTAT, EMTRICITABINE, AND TENOFOVIR ALAFENAMIDE 150; 150; 200; 10 MG/1; MG/1; MG/1; MG/1
1 TABLET ORAL DAILY
Qty: 30 TABLET | Refills: 3 | Status: SHIPPED | OUTPATIENT
Start: 2023-08-31

## 2023-08-31 NOTE — TELEPHONE ENCOUNTER
Spoke with pt regarding needed update about insurance. Pt has been removed from [facility] due to income. Pt is over income for RW/ODAP. Was able to obtain 1 month of meds. Insurance effective 9.1.23. Pt may run out of medications. Discussed that this RN will send prescription to new pharmacy. Discussed that possible PA will be needed as this is a change in insurance. Pt will then use co pay card to help with any copay once medication is approved.

## 2023-09-14 RX ORDER — ELVITEGRAVIR, COBICISTAT, EMTRICITABINE, AND TENOFOVIR ALAFENAMIDE 150; 150; 200; 10 MG/1; MG/1; MG/1; MG/1
1 TABLET ORAL DAILY
Qty: 30 TABLET | Refills: 3 | Status: SHIPPED | OUTPATIENT
Start: 2023-09-14

## 2023-09-15 NOTE — TELEPHONE ENCOUNTER
Pt will be picking up meds today. Used co pay card  Has active insurance now. Will provide updated card. Requested to schedule apt.   6 month follow up scheduled 10.18.23  Labs 1 week prior    Patient has new partner. X 1 month  Partner is not educated in HIV. Pt disclosed status and now partner wants tested. Pt will get oral test for partner. Pt is undetectable.

## 2023-09-28 ENCOUNTER — TELEPHONE (OUTPATIENT)
Dept: INFECTIOUS DISEASES | Age: 33
End: 2023-09-28

## 2023-10-09 DIAGNOSIS — B20 HIV INFECTION, UNSPECIFIED SYMPTOM STATUS (HCC): ICD-10-CM

## 2023-10-09 LAB
ALBUMIN SERPL-MCNC: 4.8 G/DL (ref 3.5–4.6)
ALP SERPL-CCNC: 25 U/L (ref 35–104)
ALT SERPL-CCNC: 13 U/L (ref 0–41)
ANION GAP SERPL CALCULATED.3IONS-SCNC: 12 MEQ/L (ref 9–15)
AST SERPL-CCNC: 17 U/L (ref 0–40)
BILIRUB SERPL-MCNC: 0.5 MG/DL (ref 0.2–0.7)
BUN SERPL-MCNC: 19 MG/DL (ref 6–20)
CALCIUM SERPL-MCNC: 9.3 MG/DL (ref 8.5–9.9)
CHLORIDE SERPL-SCNC: 102 MEQ/L (ref 95–107)
CO2 SERPL-SCNC: 26 MEQ/L (ref 20–31)
CREAT SERPL-MCNC: 1.15 MG/DL (ref 0.7–1.2)
ERYTHROCYTE [DISTWIDTH] IN BLOOD BY AUTOMATED COUNT: 12.1 % (ref 11.5–14.5)
GLOBULIN SER CALC-MCNC: 2.7 G/DL (ref 2.3–3.5)
GLUCOSE SERPL-MCNC: 92 MG/DL (ref 70–99)
HCT VFR BLD AUTO: 42.5 % (ref 42–52)
HGB BLD-MCNC: 14.9 G/DL (ref 14–18)
MCH RBC QN AUTO: 32.5 PG (ref 27–31.3)
MCHC RBC AUTO-ENTMCNC: 35.1 % (ref 33–37)
MCV RBC AUTO: 92.6 FL (ref 79–92.2)
PLATELET # BLD AUTO: 270 K/UL (ref 130–400)
POTASSIUM SERPL-SCNC: 4.3 MEQ/L (ref 3.4–4.9)
PROT SERPL-MCNC: 7.5 G/DL (ref 6.3–8)
RBC # BLD AUTO: 4.59 M/UL (ref 4.7–6.1)
SODIUM SERPL-SCNC: 140 MEQ/L (ref 135–144)
WBC # BLD AUTO: 7.1 K/UL (ref 4.8–10.8)

## 2023-10-10 LAB — RPR SER QL: NORMAL

## 2023-10-11 LAB
CD3+CD4+ CELLS # BLD: 887 CELLS/UL (ref 430–1800)
CD4 % HELPER T CELL: 34 % (ref 32–64)
HIV-1 RNA BY PCR, QN: NOT DETECTED
IMMUNODEFICIENCY MARKERS SPEC-IMP: NORMAL
SOURCE: NORMAL

## 2023-10-12 LAB
C TRACH DNA UR QL NAA+PROBE: NEGATIVE
N GONORRHOEA DNA UR QL NAA+PROBE: NEGATIVE

## 2023-10-18 ENCOUNTER — OFFICE VISIT (OUTPATIENT)
Dept: INFECTIOUS DISEASES | Age: 33
End: 2023-10-18

## 2023-10-18 VITALS
HEART RATE: 69 BPM | BODY MASS INDEX: 29.38 KG/M2 | DIASTOLIC BLOOD PRESSURE: 80 MMHG | WEIGHT: 193.2 LBS | TEMPERATURE: 97.3 F | SYSTOLIC BLOOD PRESSURE: 133 MMHG

## 2023-10-18 DIAGNOSIS — M77.11 LATERAL EPICONDYLITIS OF RIGHT ELBOW: ICD-10-CM

## 2023-10-18 DIAGNOSIS — B20 HIV INFECTION, UNSPECIFIED SYMPTOM STATUS (HCC): Primary | ICD-10-CM

## 2023-10-18 ASSESSMENT — PATIENT HEALTH QUESTIONNAIRE - PHQ9
SUM OF ALL RESPONSES TO PHQ9 QUESTIONS 1 & 2: 0
SUM OF ALL RESPONSES TO PHQ QUESTIONS 1-9: 0
SUM OF ALL RESPONSES TO PHQ QUESTIONS 1-9: 0
1. LITTLE INTEREST OR PLEASURE IN DOING THINGS: 0
2. FEELING DOWN, DEPRESSED OR HOPELESS: 0
SUM OF ALL RESPONSES TO PHQ QUESTIONS 1-9: 0
SUM OF ALL RESPONSES TO PHQ QUESTIONS 1-9: 0

## 2023-10-18 NOTE — PROGRESS NOTES
Routine apt today. Compliant with medications --- yes     Current partner -- no       Flu vaccine discussed. Refuses/ok with scheduling -- refuses     Pneumonia vacc? -- not completed  encouraged vaccine and education provided. pt is open to recieveing vaccine. Hep B vaccine/screening -- screened. Discussed Hep A vaccine/ pt is open to receiving. We also discussed TDAP/tetanus  Pt is open to receiving tetanus. He prefers [facility]; pharmacy. Order reviewed and given to pt as well      Smoking?  not ready or trying to quit--- vaping daily-- trying to cut back  Discussed importance of quiting     Etoh? socially   Denies this as a problem     Drugs? THC denies as problem but has cut back       PCP? yes     Working? yes- new job loves it. Housing? no issue     Dental-- Encouraged -- few months ago -- Dr. Hailey Giraldo -- denies issues or concerns           HIV consoling as needed: denies needs      Pt aware  U=U (undetectable equals un-transmittable) is a message used in HIV campaigns. It implies that if someone has an undetectable viral load, they can not transmit HIV. U=U depends on adhering to daily ART and maintaining an undetectable viral load. Safe sex practices are still encouraged to prevent transmission of other STI's, such as, chlamydia, herpes, gonorrhea, and syphilis. U=U was discussed at today's appointment.  Pt  verbalizes understanding of U=U.

## 2024-02-12 DIAGNOSIS — B20 HIV INFECTION, UNSPECIFIED SYMPTOM STATUS (HCC): Primary | ICD-10-CM

## 2024-02-12 RX ORDER — ELVITEGRAVIR, COBICISTAT, EMTRICITABINE, AND TENOFOVIR ALAFENAMIDE 150; 150; 200; 10 MG/1; MG/1; MG/1; MG/1
1 TABLET ORAL DAILY
Qty: 30 TABLET | Refills: 3 | Status: SHIPPED | OUTPATIENT
Start: 2024-02-12

## 2024-03-13 ENCOUNTER — TELEPHONE (OUTPATIENT)
Dept: INFECTIOUS DISEASES | Age: 34
End: 2024-03-13

## 2024-03-13 DIAGNOSIS — E55.9 VITAMIN D DEFICIENCY: Primary | ICD-10-CM

## 2024-03-13 DIAGNOSIS — B20 HIV INFECTION, UNSPECIFIED SYMPTOM STATUS (HCC): ICD-10-CM

## 2024-03-13 NOTE — TELEPHONE ENCOUNTER
Call placed to pt as appt reminder. He  also needs lab work completed. Reviewed labs ordered. He will go to [facility] out pt lab     Reviewed appt date and time. Denies issues with appt day.   Requested he call if issues arise regarding apt.        Denies other needs or concerns will call office if needed

## 2024-03-20 DIAGNOSIS — E55.9 VITAMIN D DEFICIENCY: ICD-10-CM

## 2024-03-20 DIAGNOSIS — B20 HIV INFECTION, UNSPECIFIED SYMPTOM STATUS (HCC): ICD-10-CM

## 2024-03-20 LAB
ALBUMIN SERPL-MCNC: 4.7 G/DL (ref 3.5–4.6)
ALP SERPL-CCNC: 25 U/L (ref 35–104)
ALT SERPL-CCNC: 23 U/L (ref 0–41)
ANION GAP SERPL CALCULATED.3IONS-SCNC: 10 MEQ/L (ref 9–15)
AST SERPL-CCNC: 20 U/L (ref 0–40)
BILIRUB SERPL-MCNC: 0.5 MG/DL (ref 0.2–0.7)
BUN SERPL-MCNC: 15 MG/DL (ref 6–20)
CALCIUM SERPL-MCNC: 9.7 MG/DL (ref 8.5–9.9)
CHLORIDE SERPL-SCNC: 100 MEQ/L (ref 95–107)
CHOLEST SERPL-MCNC: 214 MG/DL (ref 0–199)
CO2 SERPL-SCNC: 28 MEQ/L (ref 20–31)
CREAT SERPL-MCNC: 1.15 MG/DL (ref 0.7–1.2)
ERYTHROCYTE [DISTWIDTH] IN BLOOD BY AUTOMATED COUNT: 11.9 % (ref 11.5–14.5)
GLOBULIN SER CALC-MCNC: 3 G/DL (ref 2.3–3.5)
GLUCOSE SERPL-MCNC: 85 MG/DL (ref 70–99)
HCT VFR BLD AUTO: 42.8 % (ref 42–52)
HDLC SERPL-MCNC: 38 MG/DL (ref 40–59)
HGB BLD-MCNC: 14.7 G/DL (ref 14–18)
LDLC SERPL CALC-MCNC: ABNORMAL MG/DL (ref 0–129)
MCH RBC QN AUTO: 30.8 PG (ref 27–31.3)
MCHC RBC AUTO-ENTMCNC: 34.3 % (ref 33–37)
MCV RBC AUTO: 89.5 FL (ref 79–92.2)
PLATELET # BLD AUTO: 241 K/UL (ref 130–400)
POTASSIUM SERPL-SCNC: 4.1 MEQ/L (ref 3.4–4.9)
PROT SERPL-MCNC: 7.7 G/DL (ref 6.3–8)
RBC # BLD AUTO: 4.78 M/UL (ref 4.7–6.1)
SODIUM SERPL-SCNC: 138 MEQ/L (ref 135–144)
TRIGL SERPL-MCNC: 412 MG/DL (ref 0–150)
WBC # BLD AUTO: 7 K/UL (ref 4.8–10.8)

## 2024-03-21 LAB
RPR SER QL: NORMAL
VITAMIN D 25-HYDROXY: 21.8 NG/ML (ref 30–100)

## 2024-03-22 LAB
CD3+CD4+ CELLS # BLD: 1140 CELLS/UL (ref 430–1800)
CD4 % HELPER T CELL: 37 % (ref 32–64)
IMMUNODEFICIENCY MARKERS SPEC-IMP: NORMAL

## 2024-03-24 LAB
HIV-1 RNA BY PCR, QN: NOT DETECTED
SOURCE: NORMAL

## 2024-03-25 LAB
C TRACH DNA UR QL NAA+PROBE: NEGATIVE
N GONORRHOEA DNA UR QL NAA+PROBE: NEGATIVE

## 2024-03-27 ENCOUNTER — OFFICE VISIT (OUTPATIENT)
Dept: INFECTIOUS DISEASES | Age: 34
End: 2024-03-27
Payer: COMMERCIAL

## 2024-03-27 VITALS
TEMPERATURE: 97.4 F | BODY MASS INDEX: 31.32 KG/M2 | SYSTOLIC BLOOD PRESSURE: 139 MMHG | HEART RATE: 73 BPM | DIASTOLIC BLOOD PRESSURE: 74 MMHG | WEIGHT: 206 LBS

## 2024-03-27 DIAGNOSIS — B20 HIV INFECTION, UNSPECIFIED SYMPTOM STATUS (HCC): Primary | ICD-10-CM

## 2024-03-27 DIAGNOSIS — E78.2 MIXED HYPERLIPIDEMIA: ICD-10-CM

## 2024-03-27 PROBLEM — Z21 HIV INFECTION (HCC): Status: ACTIVE | Noted: 2024-03-27

## 2024-03-27 PROCEDURE — G8427 DOCREV CUR MEDS BY ELIG CLIN: HCPCS | Performed by: INTERNAL MEDICINE

## 2024-03-27 PROCEDURE — 1036F TOBACCO NON-USER: CPT | Performed by: INTERNAL MEDICINE

## 2024-03-27 PROCEDURE — G8484 FLU IMMUNIZE NO ADMIN: HCPCS | Performed by: INTERNAL MEDICINE

## 2024-03-27 PROCEDURE — 99214 OFFICE O/P EST MOD 30 MIN: CPT | Performed by: INTERNAL MEDICINE

## 2024-03-27 PROCEDURE — G8417 CALC BMI ABV UP PARAM F/U: HCPCS | Performed by: INTERNAL MEDICINE

## 2024-03-27 ASSESSMENT — PATIENT HEALTH QUESTIONNAIRE - PHQ9
SUM OF ALL RESPONSES TO PHQ QUESTIONS 1-9: 0
1. LITTLE INTEREST OR PLEASURE IN DOING THINGS: NOT AT ALL
SUM OF ALL RESPONSES TO PHQ9 QUESTIONS 1 & 2: 0
SUM OF ALL RESPONSES TO PHQ QUESTIONS 1-9: 0
2. FEELING DOWN, DEPRESSED OR HOPELESS: NOT AT ALL

## 2024-03-27 NOTE — PROGRESS NOTES
Mark Anthony Vernon (:  1990) is a 33 y.o. male,Established patient, here for evaluation of the following chief complaint(s):  HIV         ASSESSMENT/PLAN:  Asymptomatic HIV with suppressed virus, CD4 greater than 1000  Mixed hyperlipidemia    I had a lengthy discussion with the patient about low-fat diet and increasing routine moderate aerobic activity  Continue Genvoya for now.  May consider switching to Dovato during the next visit  Fish oil supplements over-the-counter  Vitamin D supplements over-the-counter  Patient was advised routine colorectal screening yearly.  Unsure about history of HPV vaccine  Repeat lipid profile with next blood work  CBC complete metabolic profile HIV viral load and CD4 in 6 months  Follow-up in 6 months        Subjective   SUBJECTIVE/OBJECTIVE:  HPI  Follow-up HIV with suppressed virus, on Genvoya with 100% compliance, well-tolerated.   Has bad eating habits.  Goes to the gym performing weightlifting twice a week.  Drinks alcohol only on occasion.   Patient has been with 1 sexual partner for the past 1 year.   Concerned about elevated lipid profile.   Patient smokes marijuana.  No cigarette smoking or illicit drugs  Review of Systems   All other systems reviewed and are negative.      Objective   Physical Exam  Vitals:    24 1500   BP: 139/74   Site: Left Upper Arm   Position: Sitting   Cuff Size: Medium Adult   Pulse: 73   Temp: 97.4 °F (36.3 °C)   Weight: 93.4 kg (206 lb)     General Appearance: alert and oriented to person, place and time, well-developed and well-nourished, in no acute distress  Skin: warm and dry, no rash.   Head: normocephalic and atraumatic  Eyes: anicteric sclerae  ENT: oropharynx clear and moist with normal mucous membranes. No oral thrush  Lungs: normal respiratory effort, clear lungs  Heart normal S1-S2 no murmur  Abdomen: soft, no tenderness, no megaly or masses  No leg edema  No erythema, no tenderness   Normal behavior and affect  Normal

## 2024-03-27 NOTE — PROGRESS NOTES
Routine b20 apt    Chol higher-   Very active  Eats healthy most of the time.     Same partner- no concerns    VIT d

## 2024-07-29 DIAGNOSIS — B20 HIV INFECTION, UNSPECIFIED SYMPTOM STATUS (HCC): ICD-10-CM

## 2024-07-29 RX ORDER — ELVITEGRAVIR, COBICISTAT, EMTRICITABINE, AND TENOFOVIR ALAFENAMIDE 150; 150; 200; 10 MG/1; MG/1; MG/1; MG/1
1 TABLET ORAL DAILY
Qty: 30 TABLET | Refills: 1 | Status: ACTIVE | OUTPATIENT
Start: 2024-07-29 | End: 2024-07-31 | Stop reason: SDUPTHER

## 2024-07-31 DIAGNOSIS — B20 HIV INFECTION, UNSPECIFIED SYMPTOM STATUS (HCC): ICD-10-CM

## 2024-07-31 RX ORDER — ELVITEGRAVIR, COBICISTAT, EMTRICITABINE, AND TENOFOVIR ALAFENAMIDE 150; 150; 200; 10 MG/1; MG/1; MG/1; MG/1
1 TABLET ORAL DAILY
Qty: 30 TABLET | Refills: 1 | Status: ACTIVE | OUTPATIENT
Start: 2024-07-31 | End: 2024-08-01 | Stop reason: SDUPTHER

## 2024-08-01 DIAGNOSIS — B20 HIV INFECTION, UNSPECIFIED SYMPTOM STATUS (HCC): ICD-10-CM

## 2024-08-01 RX ORDER — ELVITEGRAVIR, COBICISTAT, EMTRICITABINE, AND TENOFOVIR ALAFENAMIDE 150; 150; 200; 10 MG/1; MG/1; MG/1; MG/1
1 TABLET ORAL DAILY
Qty: 30 TABLET | Refills: 1 | Status: ACTIVE | OUTPATIENT
Start: 2024-08-01

## 2024-09-11 ENCOUNTER — TELEPHONE (OUTPATIENT)
Dept: INFECTIOUS DISEASES | Age: 34
End: 2024-09-11

## 2024-09-11 DIAGNOSIS — E78.2 MIXED HYPERLIPIDEMIA: ICD-10-CM

## 2024-09-11 DIAGNOSIS — B20 HIV INFECTION, UNSPECIFIED SYMPTOM STATUS (HCC): Primary | ICD-10-CM

## 2024-09-20 DIAGNOSIS — E78.2 MIXED HYPERLIPIDEMIA: ICD-10-CM

## 2024-09-20 DIAGNOSIS — B20 HIV INFECTION, UNSPECIFIED SYMPTOM STATUS (HCC): ICD-10-CM

## 2024-09-20 LAB
ALBUMIN SERPL-MCNC: 4.7 G/DL (ref 3.5–4.6)
ALP SERPL-CCNC: 26 U/L (ref 35–104)
ALT SERPL-CCNC: 16 U/L (ref 0–41)
ANION GAP SERPL CALCULATED.3IONS-SCNC: 9 MEQ/L (ref 9–15)
AST SERPL-CCNC: 16 U/L (ref 0–40)
BILIRUB SERPL-MCNC: 0.6 MG/DL (ref 0.2–0.7)
BUN SERPL-MCNC: 21 MG/DL (ref 6–20)
CALCIUM SERPL-MCNC: 9.8 MG/DL (ref 8.5–9.9)
CHLORIDE SERPL-SCNC: 102 MEQ/L (ref 95–107)
CHOLEST SERPL-MCNC: 185 MG/DL (ref 0–199)
CO2 SERPL-SCNC: 28 MEQ/L (ref 20–31)
CREAT SERPL-MCNC: 1.21 MG/DL (ref 0.7–1.2)
ERYTHROCYTE [DISTWIDTH] IN BLOOD BY AUTOMATED COUNT: 11.9 % (ref 11.5–14.5)
GLOBULIN SER CALC-MCNC: 3.2 G/DL (ref 2.3–3.5)
GLUCOSE SERPL-MCNC: 100 MG/DL (ref 70–99)
HCT VFR BLD AUTO: 43.5 % (ref 42–52)
HDLC SERPL-MCNC: 33 MG/DL (ref 40–59)
HEPATITIS C ANTIBODY: NONREACTIVE
HGB BLD-MCNC: 15.1 G/DL (ref 14–18)
LDLC SERPL CALC-MCNC: 105 MG/DL (ref 0–129)
MCH RBC QN AUTO: 31.5 PG (ref 27–31.3)
MCHC RBC AUTO-ENTMCNC: 34.7 % (ref 33–37)
MCV RBC AUTO: 90.6 FL (ref 79–92.2)
PLATELET # BLD AUTO: 211 K/UL (ref 130–400)
POTASSIUM SERPL-SCNC: 4.3 MEQ/L (ref 3.4–4.9)
PROT SERPL-MCNC: 7.9 G/DL (ref 6.3–8)
RBC # BLD AUTO: 4.8 M/UL (ref 4.7–6.1)
SODIUM SERPL-SCNC: 139 MEQ/L (ref 135–144)
TRIGL SERPL-MCNC: 233 MG/DL (ref 0–150)
WBC # BLD AUTO: 8.1 K/UL (ref 4.8–10.8)

## 2024-09-21 LAB
CD3+CD4+ CELLS # BLD: 919 CELLS/UL (ref 430–1800)
CD4 % HELPER T CELL: 30 % (ref 32–64)
IMMUNODEFICIENCY MARKERS SPEC-IMP: ABNORMAL

## 2024-09-22 LAB
GAMMA INTERFERON BACKGROUND BLD IA-ACNC: 0.09 IU/ML
M TB IFN-G BLD-IMP: NEGATIVE
M TB IFN-G CD4+ BCKGRND COR BLD-ACNC: 0 IU/ML
M TB IFN-G CD4+CD8+ BCKGRND COR BLD-ACNC: 0 IU/ML
MITOGEN IGNF BCKGRD COR BLD-ACNC: 9.91 IU/ML

## 2024-09-23 LAB
HIV-1 RNA BY PCR, QN: NOT DETECTED
SOURCE: NORMAL

## 2024-09-26 ENCOUNTER — OFFICE VISIT (OUTPATIENT)
Dept: INFECTIOUS DISEASES | Age: 34
End: 2024-09-26
Payer: COMMERCIAL

## 2024-09-26 VITALS
WEIGHT: 207.8 LBS | BODY MASS INDEX: 30.78 KG/M2 | HEIGHT: 69 IN | TEMPERATURE: 97.2 F | DIASTOLIC BLOOD PRESSURE: 81 MMHG | SYSTOLIC BLOOD PRESSURE: 127 MMHG | HEART RATE: 70 BPM

## 2024-09-26 DIAGNOSIS — E78.2 MIXED HYPERLIPIDEMIA: ICD-10-CM

## 2024-09-26 DIAGNOSIS — B20 HIV INFECTION, UNSPECIFIED SYMPTOM STATUS (HCC): Primary | ICD-10-CM

## 2024-09-26 PROCEDURE — G8417 CALC BMI ABV UP PARAM F/U: HCPCS | Performed by: INTERNAL MEDICINE

## 2024-09-26 PROCEDURE — G8427 DOCREV CUR MEDS BY ELIG CLIN: HCPCS | Performed by: INTERNAL MEDICINE

## 2024-09-26 PROCEDURE — 1036F TOBACCO NON-USER: CPT | Performed by: INTERNAL MEDICINE

## 2024-09-26 PROCEDURE — 99214 OFFICE O/P EST MOD 30 MIN: CPT | Performed by: INTERNAL MEDICINE

## 2024-09-26 RX ORDER — CHLORAL HYDRATE 500 MG
1000 CAPSULE ORAL DAILY
COMMUNITY

## 2024-09-26 ASSESSMENT — PATIENT HEALTH QUESTIONNAIRE - PHQ9
SUM OF ALL RESPONSES TO PHQ9 QUESTIONS 1 & 2: 0
SUM OF ALL RESPONSES TO PHQ QUESTIONS 1-9: 0
1. LITTLE INTEREST OR PLEASURE IN DOING THINGS: NOT AT ALL
2. FEELING DOWN, DEPRESSED OR HOPELESS: NOT AT ALL
SUM OF ALL RESPONSES TO PHQ QUESTIONS 1-9: 0

## 2024-09-27 ENCOUNTER — TELEPHONE (OUTPATIENT)
Dept: INFECTIOUS DISEASES | Age: 34
End: 2024-09-27

## 2024-09-27 NOTE — TELEPHONE ENCOUNTER
Cabenuva PA denied.   Ref # [id number]    Appeal process started with assistance from [facility].     Pt updated

## 2024-10-03 NOTE — TELEPHONE ENCOUNTER
Appeal completed.   [facility] pharmacist followed up. Appeal canceled as medication is under medical.       Call placed to [facility]  Spoke with Nati  Verified medication is billable under medical.   Co pay is 20% after deductible is met, which is 3,000.     Pts pharmacy on insurance card [facility]       Pt updated regarding process.

## 2024-10-07 ENCOUNTER — OFFICE VISIT (OUTPATIENT)
Dept: FAMILY MEDICINE CLINIC | Age: 34
End: 2024-10-07
Payer: COMMERCIAL

## 2024-10-07 VITALS
RESPIRATION RATE: 18 BRPM | WEIGHT: 205 LBS | OXYGEN SATURATION: 98 % | TEMPERATURE: 97.3 F | HEART RATE: 68 BPM | DIASTOLIC BLOOD PRESSURE: 64 MMHG | SYSTOLIC BLOOD PRESSURE: 126 MMHG | HEIGHT: 69 IN | BODY MASS INDEX: 30.36 KG/M2

## 2024-10-07 DIAGNOSIS — J40 BRONCHITIS: Primary | ICD-10-CM

## 2024-10-07 DIAGNOSIS — R07.81 RIB PAIN ON RIGHT SIDE: ICD-10-CM

## 2024-10-07 PROCEDURE — G8427 DOCREV CUR MEDS BY ELIG CLIN: HCPCS | Performed by: NURSE PRACTITIONER

## 2024-10-07 PROCEDURE — G8417 CALC BMI ABV UP PARAM F/U: HCPCS | Performed by: NURSE PRACTITIONER

## 2024-10-07 PROCEDURE — 99214 OFFICE O/P EST MOD 30 MIN: CPT | Performed by: NURSE PRACTITIONER

## 2024-10-07 PROCEDURE — 1036F TOBACCO NON-USER: CPT | Performed by: NURSE PRACTITIONER

## 2024-10-07 PROCEDURE — G8484 FLU IMMUNIZE NO ADMIN: HCPCS | Performed by: NURSE PRACTITIONER

## 2024-10-07 RX ORDER — ALBUTEROL SULFATE 90 UG/1
2 INHALANT RESPIRATORY (INHALATION) EVERY 6 HOURS PRN
Qty: 1 EACH | Refills: 0 | Status: SHIPPED | OUTPATIENT
Start: 2024-10-07

## 2024-10-07 RX ORDER — METHYLPREDNISOLONE 4 MG
TABLET, DOSE PACK ORAL
Qty: 1 KIT | Refills: 0 | Status: SHIPPED | OUTPATIENT
Start: 2024-10-07

## 2024-10-07 RX ORDER — AZITHROMYCIN 250 MG/1
TABLET, FILM COATED ORAL
Qty: 6 TABLET | Refills: 0 | Status: SHIPPED | OUTPATIENT
Start: 2024-10-07 | End: 2024-10-17

## 2024-10-07 SDOH — ECONOMIC STABILITY: FOOD INSECURITY: WITHIN THE PAST 12 MONTHS, THE FOOD YOU BOUGHT JUST DIDN'T LAST AND YOU DIDN'T HAVE MONEY TO GET MORE.: NEVER TRUE

## 2024-10-07 SDOH — ECONOMIC STABILITY: FOOD INSECURITY: WITHIN THE PAST 12 MONTHS, YOU WORRIED THAT YOUR FOOD WOULD RUN OUT BEFORE YOU GOT MONEY TO BUY MORE.: NEVER TRUE

## 2024-10-07 SDOH — ECONOMIC STABILITY: INCOME INSECURITY: HOW HARD IS IT FOR YOU TO PAY FOR THE VERY BASICS LIKE FOOD, HOUSING, MEDICAL CARE, AND HEATING?: NOT HARD AT ALL

## 2024-10-08 ASSESSMENT — ENCOUNTER SYMPTOMS
SINUS PRESSURE: 1
SORE THROAT: 0
DIARRHEA: 0
CHEST TIGHTNESS: 0
HEARTBURN: 0
TROUBLE SWALLOWING: 0
ABDOMINAL PAIN: 0
WHEEZING: 1
VOMITING: 0
SHORTNESS OF BREATH: 1
COUGH: 1
HEMOPTYSIS: 0
NAUSEA: 0
RHINORRHEA: 1
FACIAL SWELLING: 0

## 2024-10-08 NOTE — PROGRESS NOTES
VW)     Standing Status:   Future     Number of Occurrences:   1     Standing Expiration Date:   10/7/2025     Orders Placed This Encounter   Medications    azithromycin (ZITHROMAX) 250 MG tablet     Simg on day 1 followed by 250mg on days 2 - 5     Dispense:  6 tablet     Refill:  0    methylPREDNISolone (MEDROL, VERA,) 4 MG tablet     Sig: Take by mouth.     Dispense:  1 kit     Refill:  0    albuterol sulfate HFA (PROVENTIL;VENTOLIN;PROAIR) 108 (90 Base) MCG/ACT inhaler     Sig: Inhale 2 puffs into the lungs every 6 hours as needed for Wheezing     Dispense:  1 each     Refill:  0     There are no discontinued medications.  Return if symptoms worsen or fail to improve.      Reviewed with the patient: currentclinical status, medications, activities and diet.     Side effects, adverse effects of the medicationprescribed today, as well as treatment plan/ rationale and result expectations havebeen discussed with the patient who expresses understanding and desires to proceed.Pt instructions reviewed and given to patient.     Close follow up to evaluate treatment resultsand for coordination of care.  I have reviewed the patient's medical historyin detail and updated the computerized patient record.    Ruma Bowers, APRN - CNP

## 2024-10-08 NOTE — TELEPHONE ENCOUNTER
PA requested again.   PA completed for medical coverage as pt was denied under pharmacy.   PA request not specific.

## 2024-10-14 NOTE — TELEPHONE ENCOUNTER
Call placed to [facility]  PA under pharm denied.   Spoke with Dulce - reviewed case. Noted under medical benefit possibly.   Medical benefit billing number [phone]  Spoke with Tory  Noted through [facility]. Discussed denial and need to bill medical.   For medical billing must call [phone]  Predetermination under medical.   Note J COD -- \  Payer ID [id number]      Pt updated

## 2024-10-15 NOTE — TELEPHONE ENCOUNTER
Call placed to [facility] for Predetermination under medical billing.       Spoke with Yuri  Reviewed all needed information  Approval noted 10.15.2024---10.15.2025  Unable to note out of pocket.   Will be using [facility].   Provide approval number to pharm for medical billing  Approval number -- [id number]      Pt updated

## 2024-10-17 NOTE — TELEPHONE ENCOUNTER
Pt called in stating. He spoke with Siobhan was told medication was shipped.  Unknown coverage/co pay and states he doesn't know where it was shipped to.       Call placed to [facility] to discuss. Spoke with Sharla  States no paid claim. Notes 3 rejections as of yesterday.   Again - discussing approval under medical. Unsure co pay  Stated only can see pharm notes not mail order. States this is who would be able to help with billing medical      Transferred to mail order - [facility]   Spoke with Marisabel Mccann can not bill under medical. Insurance requested pharm change.   Prescription was transferred out to [facility].   Phone # [phone]

## 2024-10-18 NOTE — TELEPHONE ENCOUNTER
Spoke with Rigoberto with [facility]  She did add co pay card information.   Co pay 22.92$ and covered       Spoke with pt.   Happy to start injection.   Discusses to continue to take ART until day of injection.   Last dose of ART same day of injection.   Will discuss further at injection apt as well.   Per pt request and schedule   Injection date 10.29.24         Spoke with Rigoberto - delivery set up for 10.24.24

## 2024-10-23 ENCOUNTER — OFFICE VISIT (OUTPATIENT)
Dept: FAMILY MEDICINE CLINIC | Age: 34
End: 2024-10-23
Payer: COMMERCIAL

## 2024-10-23 VITALS
HEART RATE: 79 BPM | BODY MASS INDEX: 30.36 KG/M2 | DIASTOLIC BLOOD PRESSURE: 68 MMHG | SYSTOLIC BLOOD PRESSURE: 118 MMHG | OXYGEN SATURATION: 96 % | TEMPERATURE: 97.7 F | RESPIRATION RATE: 18 BRPM | WEIGHT: 205 LBS | HEIGHT: 69 IN

## 2024-10-23 DIAGNOSIS — S89.92XA INJURY OF LEFT KNEE, INITIAL ENCOUNTER: ICD-10-CM

## 2024-10-23 DIAGNOSIS — M23.52 RECURRENT LEFT KNEE INSTABILITY: Primary | ICD-10-CM

## 2024-10-23 PROCEDURE — G8417 CALC BMI ABV UP PARAM F/U: HCPCS | Performed by: NURSE PRACTITIONER

## 2024-10-23 PROCEDURE — G8427 DOCREV CUR MEDS BY ELIG CLIN: HCPCS | Performed by: NURSE PRACTITIONER

## 2024-10-23 PROCEDURE — 1036F TOBACCO NON-USER: CPT | Performed by: NURSE PRACTITIONER

## 2024-10-23 PROCEDURE — 99213 OFFICE O/P EST LOW 20 MIN: CPT | Performed by: NURSE PRACTITIONER

## 2024-10-23 PROCEDURE — G8484 FLU IMMUNIZE NO ADMIN: HCPCS | Performed by: NURSE PRACTITIONER

## 2024-10-23 NOTE — PROGRESS NOTES
Mark Anthony Vernon (:  1990) is a 34 y.o. male, Established patient, here for evaluation of the following chief complaint(s):  Knee Pain (Patient is here with c/o left knee pain. Patient states he dislocated his knee while playing volleyball as a kid. He states last week it started swelling and states it's crunching and a lot of sounds that don't sound good. Patient denies any pain. )          Subjective   History of Present Illness  The patient presents for evaluation of knee pain.    He reports a sensation of bone fragments in her knee, accompanied by a crunching sound. Occasionally, his knee locks up or deviates during walking. This issue is reminiscent of a similar problem he experienced 15 to 20 years ago, which was resolved through arthroscopic surgery to remove bone fragments from behind his kneecap. He suspects a recurrence of this issue, though less frequently, and believes there may be one or two bone fragments present. He expresses a desire for further examination and potential treatment with gel injections to prevent future occurrences.    He does not experience pain but describes her knee as unstable. He also reports no muscle tension or related issues. His knee swelled significantly last Friday after playing volleyball without a brace, although it was only slightly sore the previous day. He took Tylenol and elevated his leg to reduce the swelling, which has since improved. He has not worn a brace for approximately a year and a half. He notes that massaging his knee alleviates the discomfort. He is interested in having an x-ray to check for any bone fractures.    Past Medical History:   Diagnosis Date    HIV (human immunodeficiency virus infection) ([facility])      Past Surgical History:   Procedure Laterality Date    KNEE CARTILAGE SURGERY Left      Social History     Socioeconomic History    Marital status: Single     Spouse name: Not on file    Number of children: Not on file    Years of

## 2024-10-28 NOTE — PROGRESS NOTES
work for this issue?  no  Is this issue being addressed under a worker's comp claim?  no    Review of Systems:    Review of Systems   Constitutional:  Negative for activity change, appetite change and chills.   HENT:  Negative for congestion, ear pain and hearing loss.    Eyes:  Negative for pain, discharge and itching.   Respiratory:  Negative for cough and shortness of breath.    Cardiovascular:  Negative for chest pain and leg swelling.   Gastrointestinal:  Negative for abdominal pain, constipation and diarrhea.   Endocrine: Negative for cold intolerance, heat intolerance and polydipsia.   Genitourinary:  Negative for difficulty urinating, flank pain and frequency.   Skin:  Negative for rash and wound.   Allergic/Immunologic: Negative for environmental allergies and food allergies.   Neurological:  Negative for dizziness, seizures and syncope.       Physical Exam:    Examination of the left knee    Gait:  antalgic gait affecting left  Inspection:  neutral  Swelling:  none  Erythema:  none  Ecchymosis:  none  Effusion:  2+  Palpation:  distal lateral femoral condyle, medial patella, lateral patella, and there is a palpable loose body on the lateral side of the knee.  I cannot tell if it is affixed to soft tissue or free moving.  It appears to be staying put and is consistent with what I am seeing on the x-ray  Extension:  5  Flexion:  100  Strength:  5  Varus/Valgus Instability:  none  Anterior/Posterior Instability:  none  Myesha:  negative  Thessaly:  negative  Modified Apley:  negative  Lachman:  negative  Patellar compression:  positive  Neurological/Vascular:  Sensation grossly intact.  Dorsalis pedis palpable and 2+    Radiographs:  X-rays show that the patient has significant patellofemoral osteoarthritis and a questionable loose body adjacent to the lateral femoral condyle.  There appear to be 2 loose bodies and a Baker's cyst.  MRI: None    Diagnosis:   Diagnosis Orders   1. Loose body of left knee  MRI

## 2024-10-29 ENCOUNTER — OFFICE VISIT (OUTPATIENT)
Dept: ORTHOPEDIC SURGERY | Age: 34
End: 2024-10-29
Payer: COMMERCIAL

## 2024-10-29 ENCOUNTER — NURSE ONLY (OUTPATIENT)
Dept: INFECTIOUS DISEASES | Age: 34
End: 2024-10-29

## 2024-10-29 VITALS — BODY MASS INDEX: 30.36 KG/M2 | WEIGHT: 205 LBS | HEIGHT: 69 IN

## 2024-10-29 DIAGNOSIS — M17.12 PRIMARY OSTEOARTHRITIS OF LEFT KNEE: ICD-10-CM

## 2024-10-29 DIAGNOSIS — Z21 HIV INFECTION, UNSPECIFIED SYMPTOM STATUS (HCC): Primary | ICD-10-CM

## 2024-10-29 DIAGNOSIS — M23.42 LOOSE BODY OF LEFT KNEE: Primary | ICD-10-CM

## 2024-10-29 PROCEDURE — G8417 CALC BMI ABV UP PARAM F/U: HCPCS | Performed by: ORTHOPAEDIC SURGERY

## 2024-10-29 PROCEDURE — G8427 DOCREV CUR MEDS BY ELIG CLIN: HCPCS | Performed by: ORTHOPAEDIC SURGERY

## 2024-10-29 PROCEDURE — G8484 FLU IMMUNIZE NO ADMIN: HCPCS | Performed by: ORTHOPAEDIC SURGERY

## 2024-10-29 PROCEDURE — 1036F TOBACCO NON-USER: CPT | Performed by: ORTHOPAEDIC SURGERY

## 2024-10-29 PROCEDURE — 99204 OFFICE O/P NEW MOD 45 MIN: CPT | Performed by: ORTHOPAEDIC SURGERY

## 2024-10-29 ASSESSMENT — ENCOUNTER SYMPTOMS
EYE ITCHING: 0
CONSTIPATION: 0
ABDOMINAL PAIN: 0
EYE DISCHARGE: 0
EYE PAIN: 0
SHORTNESS OF BREATH: 0
COUGH: 0
DIARRHEA: 0

## 2024-10-29 NOTE — PROGRESS NOTES
Pt presented to office for scheduled Cabenuva injection.     Recent knee issues. Following with ortho. Prob surgery. MRI next week.     First injection of Cabenuva today  Last day of oral ART today. Pt verbalized understanding.         per orders of Dr. Gill, injection of Rilpivirine given in Right upper quad. gluteus by Sammie Acevedo.  Injection of Cabotegravir given in Left upper quad. gluteus by Sammie Acevedo.    Patient remained in clinic for 10-15 minutes afterwards,    Pt tolerated todays injection well. No issues or compliants. Instructed pt not to massage site, leave band aids on today and call office if needed or issues arise       Rilpivirine   LOT: [id number]  Expiration: 7/2026    Cabotegravir  Lot: [id number]  Exp: 7/2026    Next Cabenuva INJ --- 11.26.24    Reviewed other scheduled apts. -- ortho apts scheduled  Dr Gill Follow up --- TBD based on poss ortho      Labs to be completed --  next injection      Patient to call office or go to ER to report any adverse reaction immediately. Discussed s/s of allergic reaction and pt verbalized understanding.     Requested pt call office if needed or if issues arise.

## 2024-11-07 ENCOUNTER — HOSPITAL ENCOUNTER (OUTPATIENT)
Dept: MRI IMAGING | Age: 34
Discharge: HOME | End: 2024-11-09
Attending: ORTHOPAEDIC SURGERY
Payer: COMMERCIAL

## 2024-11-07 DIAGNOSIS — M23.42 LOOSE BODY OF LEFT KNEE: ICD-10-CM

## 2024-11-07 PROCEDURE — 73721 MRI JNT OF LWR EXTRE W/O DYE: CPT

## 2024-11-15 ENCOUNTER — TELEPHONE (OUTPATIENT)
Dept: INFECTIOUS DISEASES | Age: 34
End: 2024-11-15

## 2024-11-19 ENCOUNTER — OFFICE VISIT (OUTPATIENT)
Dept: ORTHOPEDIC SURGERY | Age: 34
End: 2024-11-19
Payer: COMMERCIAL

## 2024-11-19 VITALS
WEIGHT: 205 LBS | HEIGHT: 69 IN | BODY MASS INDEX: 30.36 KG/M2 | TEMPERATURE: 98.7 F | HEART RATE: 71 BPM | OXYGEN SATURATION: 97 %

## 2024-11-19 DIAGNOSIS — M23.42 BODIES, LOOSE, JOINT, KNEE, LEFT: Primary | ICD-10-CM

## 2024-11-19 DIAGNOSIS — M17.12 PRIMARY OSTEOARTHRITIS OF LEFT KNEE: ICD-10-CM

## 2024-11-19 PROCEDURE — G8427 DOCREV CUR MEDS BY ELIG CLIN: HCPCS | Performed by: ORTHOPAEDIC SURGERY

## 2024-11-19 PROCEDURE — 1036F TOBACCO NON-USER: CPT | Performed by: ORTHOPAEDIC SURGERY

## 2024-11-19 PROCEDURE — G8484 FLU IMMUNIZE NO ADMIN: HCPCS | Performed by: ORTHOPAEDIC SURGERY

## 2024-11-19 PROCEDURE — 99214 OFFICE O/P EST MOD 30 MIN: CPT | Performed by: ORTHOPAEDIC SURGERY

## 2024-11-19 PROCEDURE — G8417 CALC BMI ABV UP PARAM F/U: HCPCS | Performed by: ORTHOPAEDIC SURGERY

## 2024-11-19 RX ORDER — CABOTEGRAVIR AND RILPIVIRINE 600-900/3
1 KIT INTRAMUSCULAR ONCE
COMMUNITY

## 2024-11-19 NOTE — PROGRESS NOTES
Patient ID:  Mark Anthony Vernon is a 34 y.o. male who presents today for follow up of left knee pain.  MRI results on 11/7/2024 show:      FINDINGS:  MENISCI: Intact medial and lateral menisci.     CRUCIATE LIGAMENTS: Intact anterior cruciate and posterior cruciate ligaments.     EXTENSOR MECHANISM: Intact quadriceps and patellar tendons. Intact patellar  retinacula.     LATERAL COLLATERAL LIGAMENT COMPLEX: Intact IT band, lateral collateral  ligament proper, biceps femoris tendon and popliteus tendon.     MEDIAL COLLATERAL LIGAMENT COMPLEX: The superficial and deep components of  the medial collateral ligament are intact.     KNEE JOINT: Grade 4 chondrosis of the patellofemoral and grade 2-3 chondrosis  of the medial and lateral mediolateral compartments.  Mild joint effusion  with multiple intra-articular bony bodies within the posterior knee and  suprapatellar space.     BONE MARROW: No evidence of acute fracture or aggressive marrow replacing  lesion.     IMPRESSION:  1. No cruciate ligament or meniscal injury.  2. Moderate to advanced chondrosis of the tricompartmental chondrosis with  intra-articular likely degenerative bodies.    Patient continues to have aching in the knee but his main issue is the locking and catching and giving way.  In other words, the mechanical symptoms.    Injury: yes; in past, had scope and 5 pieces of bone removed from knee  Metal Allergy: no     Location: left knee pain, located on the global aspect of the knee  Pain: yes; 7 on a scale of 1 to 10  Onset: gradual  Duration: 3 weeks  Frequency:  occurs intermittently  Quality: sharp superimposed on a baseline of aching pain  Swelling: patient notes intermittent swelling of the joint  Aggravating factors: weight bearing activity, standing, and walking  Alleviating factors: removing weight from leg and rest  Mechanical symptoms: catching  Radiation: no     Activities: walking independently  Restriction:  decreased ambulatory

## 2024-11-25 PROBLEM — M23.42 LOOSE BODY OF LEFT KNEE: Status: ACTIVE | Noted: 2024-11-25

## 2024-11-25 PROBLEM — M17.12 PRIMARY OSTEOARTHRITIS OF LEFT KNEE: Status: ACTIVE | Noted: 2024-11-25

## 2024-11-26 ENCOUNTER — NURSE ONLY (OUTPATIENT)
Dept: INFECTIOUS DISEASES | Age: 34
End: 2024-11-26

## 2024-11-26 DIAGNOSIS — Z21 HIV INFECTION, UNSPECIFIED SYMPTOM STATUS (HCC): ICD-10-CM

## 2024-11-26 DIAGNOSIS — Z21 HIV INFECTION, UNSPECIFIED SYMPTOM STATUS (HCC): Primary | ICD-10-CM

## 2024-11-26 LAB
ALBUMIN SERPL-MCNC: 4.8 G/DL (ref 3.5–4.6)
ALP SERPL-CCNC: 23 U/L (ref 35–104)
ALT SERPL-CCNC: 49 U/L (ref 0–41)
ANION GAP SERPL CALCULATED.3IONS-SCNC: 9 MEQ/L (ref 9–15)
AST SERPL-CCNC: 28 U/L (ref 0–40)
BILIRUB SERPL-MCNC: 0.6 MG/DL (ref 0.2–0.7)
BUN SERPL-MCNC: 21 MG/DL (ref 6–20)
CALCIUM SERPL-MCNC: 9.7 MG/DL (ref 8.5–9.9)
CHLORIDE SERPL-SCNC: 103 MEQ/L (ref 95–107)
CO2 SERPL-SCNC: 29 MEQ/L (ref 20–31)
CREAT SERPL-MCNC: 1.1 MG/DL (ref 0.7–1.2)
ERYTHROCYTE [DISTWIDTH] IN BLOOD BY AUTOMATED COUNT: 11.9 % (ref 11.5–14.5)
GLOBULIN SER CALC-MCNC: 2.7 G/DL (ref 2.3–3.5)
GLUCOSE SERPL-MCNC: 93 MG/DL (ref 70–99)
HCT VFR BLD AUTO: 43.9 % (ref 42–52)
HGB BLD-MCNC: 15.2 G/DL (ref 14–18)
MCH RBC QN AUTO: 31 PG (ref 27–31.3)
MCHC RBC AUTO-ENTMCNC: 34.6 % (ref 33–37)
MCV RBC AUTO: 89.4 FL (ref 79–92.2)
PLATELET # BLD AUTO: 207 K/UL (ref 130–400)
POTASSIUM SERPL-SCNC: 4 MEQ/L (ref 3.4–4.9)
PROT SERPL-MCNC: 7.5 G/DL (ref 6.3–8)
RBC # BLD AUTO: 4.91 M/UL (ref 4.7–6.1)
SODIUM SERPL-SCNC: 141 MEQ/L (ref 135–144)
WBC # BLD AUTO: 6.5 K/UL (ref 4.8–10.8)

## 2024-11-26 NOTE — PROGRESS NOTES
Pt presented to office for scheduled Cabenuva injection.     Will be having knee surgery 12.10.24      Established with Cabenuva already -- first injection tolerated well.   per orders of Dr. Bosch/Jairo, injection of Rilpivirine given in Right upper quad. gluteus by Sammie Acevedo.  Injection of Cabotegravir given in Left upper quad. gluteus by Sammie Acevedo.      Pt has tolerated injections in the past with out issues.     Patient remained in clinic for 5-10 minutes afterwards,    Pt tolerated todays injection well. No issues or compliants. Instructed pt not to massage site, leave band aids on today and call office if needed or issues arise       Rilpivirine   LOT: [id number]  Expiration: 7/2026    Cabotegravir  Lot: [id number]  Exp: 7/2026    Next Cabenuva INJ --- 1.24.25    Reviewed other scheduled apts. --   Dr Gill Follow up --- 1.16.25      Labs to be completed --  today.       Patient to call office or go to ER to report any adverse reaction immediately. Discussed s/s of allergic reaction and pt verbalized understanding.     Requested pt call office if needed or if issues arise.

## 2024-11-27 LAB
CD3+CD4+ CELLS # BLD: 984 CELLS/UL (ref 430–1800)
CD4 % HELPER T CELL: 30 % (ref 32–64)
HIV QUANT LOG: <1.3 LOG CPY/ML
HIV-1 RNA BY PCR, QN: <20 CPY/ML
HIV-1 RNA BY PCR, QN: DETECTED
IMMUNODEFICIENCY MARKERS SPEC-IMP: ABNORMAL
SOURCE: ABNORMAL

## 2024-12-03 ENCOUNTER — OFFICE VISIT (OUTPATIENT)
Dept: ORTHOPEDIC SURGERY | Age: 34
End: 2024-12-03

## 2024-12-03 VITALS
WEIGHT: 210 LBS | OXYGEN SATURATION: 95 % | DIASTOLIC BLOOD PRESSURE: 60 MMHG | HEART RATE: 75 BPM | BODY MASS INDEX: 31.1 KG/M2 | SYSTOLIC BLOOD PRESSURE: 122 MMHG | TEMPERATURE: 97.9 F | HEIGHT: 69 IN

## 2024-12-03 DIAGNOSIS — Z01.818 PRE-OP EXAM: Primary | ICD-10-CM

## 2024-12-03 PROCEDURE — PREOPEXAM PRE-OP EXAM: Performed by: PHYSICIAN ASSISTANT

## 2024-12-03 RX ORDER — CHLORHEXIDINE GLUCONATE 40 MG/ML
SOLUTION TOPICAL
Qty: 118 ML | Refills: 0 | Status: SHIPPED | OUTPATIENT
Start: 2024-12-03

## 2024-12-03 RX ORDER — SODIUM CHLORIDE 0.9 % (FLUSH) 0.9 %
5-40 SYRINGE (ML) INJECTION PRN
OUTPATIENT
Start: 2024-12-03

## 2024-12-03 RX ORDER — SODIUM CHLORIDE, SODIUM LACTATE, POTASSIUM CHLORIDE, CALCIUM CHLORIDE 600; 310; 30; 20 MG/100ML; MG/100ML; MG/100ML; MG/100ML
INJECTION, SOLUTION INTRAVENOUS CONTINUOUS
OUTPATIENT
Start: 2024-12-03

## 2024-12-03 RX ORDER — SODIUM CHLORIDE 9 MG/ML
INJECTION, SOLUTION INTRAVENOUS PRN
OUTPATIENT
Start: 2024-12-03

## 2024-12-03 RX ORDER — SODIUM CHLORIDE 0.9 % (FLUSH) 0.9 %
5-40 SYRINGE (ML) INJECTION EVERY 12 HOURS SCHEDULED
OUTPATIENT
Start: 2024-12-03

## 2024-12-03 NOTE — H&P
Subjective:     Patient is a 34 y.o.  male presented with a history of left knee locking.  Onset of symptoms was gradual 3 years ago with gradually worsening course since that time. He is being admitted for surgical management of this condition. The indications for the procedure include MRI scan showing loose bodies as well as significant degenerative osteoarthritis of the left knee.    Patient Active Problem List    Diagnosis Date Noted    Primary osteoarthritis of left knee 11/25/2024    Loose body of left knee 11/25/2024    Mixed hyperlipidemia 03/27/2024    HIV infection (HCC) 03/27/2024     Past Medical History:   Diagnosis Date    HIV (human immunodeficiency virus infection) (HCC)       Past Surgical History:   Procedure Laterality Date    KNEE CARTILAGE SURGERY Left 2005      Not in a hospital admission.  No Known Allergies   Social History     Tobacco Use    Smoking status: Former     Current packs/day: 0.00     Types: Cigarettes     Quit date: 7/14/2021     Years since quitting: 3.3    Smokeless tobacco: Never   Substance Use Topics    Alcohol use: Yes     Comment: social      Family History   Problem Relation Age of Onset    Lupus Mother     No Known Problems Father     Diabetes Maternal Grandmother     Diabetes type 2  Maternal Grandmother       Review of Systems  A comprehensive review of systems was negative except for: Musculoskeletal: positive for left knee locking    Objective:     @IPVITALS[8@  /60 (Site: Left Upper Arm, Position: Sitting, Cuff Size: Large Adult)   Pulse 75   Temp 97.9 °F (36.6 °C) (Temporal)   Ht 1.753 m (5' 9\")   Wt 95.3 kg (210 lb)   SpO2 95%   BMI 31.01 kg/m²   General appearance: alert, appears stated age, and cooperative  Head: Normocephalic, without obvious abnormality, atraumatic  Eyes: conjunctivae/corneas clear. PERRL, EOM's intact. Fundi benign.  Ears: normal TM's and external ear canals both ears  Nose: Nares normal. Septum midline. Mucosa normal. No

## 2024-12-10 ENCOUNTER — ANESTHESIA (OUTPATIENT)
Dept: OPERATING ROOM | Age: 34
End: 2024-12-10
Payer: COMMERCIAL

## 2024-12-10 ENCOUNTER — ANESTHESIA EVENT (OUTPATIENT)
Dept: OPERATING ROOM | Age: 34
End: 2024-12-10
Payer: COMMERCIAL

## 2024-12-10 ENCOUNTER — HOSPITAL ENCOUNTER (OUTPATIENT)
Age: 34
Setting detail: OUTPATIENT SURGERY
Discharge: HOME | End: 2024-12-10
Attending: ORTHOPAEDIC SURGERY | Admitting: ORTHOPAEDIC SURGERY
Payer: COMMERCIAL

## 2024-12-10 ENCOUNTER — APPOINTMENT (OUTPATIENT)
Dept: ULTRASOUND IMAGING | Age: 34
End: 2024-12-10
Attending: ORTHOPAEDIC SURGERY
Payer: COMMERCIAL

## 2024-12-10 VITALS
HEIGHT: 69 IN | TEMPERATURE: 97.3 F | RESPIRATION RATE: 17 BRPM | OXYGEN SATURATION: 98 % | SYSTOLIC BLOOD PRESSURE: 143 MMHG | HEART RATE: 71 BPM | WEIGHT: 205 LBS | DIASTOLIC BLOOD PRESSURE: 93 MMHG | BODY MASS INDEX: 30.36 KG/M2

## 2024-12-10 DIAGNOSIS — M23.42 LOOSE BODY OF LEFT KNEE: Primary | ICD-10-CM

## 2024-12-10 DIAGNOSIS — M17.12 PRIMARY OSTEOARTHRITIS OF LEFT KNEE: ICD-10-CM

## 2024-12-10 PROCEDURE — 6360000002 HC RX W HCPCS: Performed by: ANESTHESIOLOGY

## 2024-12-10 PROCEDURE — 2709999900 HC NON-CHARGEABLE SUPPLY: Performed by: ORTHOPAEDIC SURGERY

## 2024-12-10 PROCEDURE — 88304 TISSUE EXAM BY PATHOLOGIST: CPT

## 2024-12-10 PROCEDURE — 6360000002 HC RX W HCPCS: Performed by: PHYSICIAN ASSISTANT

## 2024-12-10 PROCEDURE — 3700000001 HC ADD 15 MINUTES (ANESTHESIA): Performed by: ORTHOPAEDIC SURGERY

## 2024-12-10 PROCEDURE — A4217 STERILE WATER/SALINE, 500 ML: HCPCS | Performed by: ORTHOPAEDIC SURGERY

## 2024-12-10 PROCEDURE — 2580000003 HC RX 258: Performed by: ORTHOPAEDIC SURGERY

## 2024-12-10 PROCEDURE — 7100000000 HC PACU RECOVERY - FIRST 15 MIN: Performed by: ORTHOPAEDIC SURGERY

## 2024-12-10 PROCEDURE — 64447 NJX AA&/STRD FEMORAL NRV IMG: CPT | Performed by: ANESTHESIOLOGY

## 2024-12-10 PROCEDURE — 76942 ECHO GUIDE FOR BIOPSY: CPT

## 2024-12-10 PROCEDURE — 3700000000 HC ANESTHESIA ATTENDED CARE: Performed by: ORTHOPAEDIC SURGERY

## 2024-12-10 PROCEDURE — 7100000010 HC PHASE II RECOVERY - FIRST 15 MIN: Performed by: ORTHOPAEDIC SURGERY

## 2024-12-10 PROCEDURE — 2580000003 HC RX 258: Performed by: ANESTHESIOLOGY

## 2024-12-10 PROCEDURE — 7100000001 HC PACU RECOVERY - ADDTL 15 MIN: Performed by: ORTHOPAEDIC SURGERY

## 2024-12-10 PROCEDURE — 6360000002 HC RX W HCPCS: Performed by: ORTHOPAEDIC SURGERY

## 2024-12-10 PROCEDURE — 7100000011 HC PHASE II RECOVERY - ADDTL 15 MIN: Performed by: ORTHOPAEDIC SURGERY

## 2024-12-10 PROCEDURE — 3600000013 HC SURGERY LEVEL 3 ADDTL 15MIN: Performed by: ORTHOPAEDIC SURGERY

## 2024-12-10 PROCEDURE — 3600000003 HC SURGERY LEVEL 3 BASE: Performed by: ORTHOPAEDIC SURGERY

## 2024-12-10 PROCEDURE — 2580000003 HC RX 258: Performed by: PHYSICIAN ASSISTANT

## 2024-12-10 RX ORDER — SODIUM CHLORIDE 0.9 % (FLUSH) 0.9 %
5-40 SYRINGE (ML) INJECTION PRN
Status: DISCONTINUED | OUTPATIENT
Start: 2024-12-10 | End: 2024-12-10 | Stop reason: HOSPADM

## 2024-12-10 RX ORDER — SODIUM CHLORIDE 0.9 % (FLUSH) 0.9 %
5-40 SYRINGE (ML) INJECTION EVERY 12 HOURS SCHEDULED
Status: DISCONTINUED | OUTPATIENT
Start: 2024-12-10 | End: 2024-12-10 | Stop reason: HOSPADM

## 2024-12-10 RX ORDER — ONDANSETRON 2 MG/ML
4 INJECTION INTRAMUSCULAR; INTRAVENOUS
Status: DISCONTINUED | OUTPATIENT
Start: 2024-12-10 | End: 2024-12-10 | Stop reason: HOSPADM

## 2024-12-10 RX ORDER — METOCLOPRAMIDE HYDROCHLORIDE 5 MG/ML
10 INJECTION INTRAMUSCULAR; INTRAVENOUS
Status: DISCONTINUED | OUTPATIENT
Start: 2024-12-10 | End: 2024-12-10 | Stop reason: HOSPADM

## 2024-12-10 RX ORDER — MIDAZOLAM HYDROCHLORIDE 1 MG/ML
INJECTION, SOLUTION INTRAMUSCULAR; INTRAVENOUS
Status: DISCONTINUED
Start: 2024-12-10 | End: 2024-12-10 | Stop reason: HOSPADM

## 2024-12-10 RX ORDER — LIDOCAINE HYDROCHLORIDE 20 MG/ML
INJECTION, SOLUTION EPIDURAL; INFILTRATION; INTRACAUDAL; PERINEURAL
Status: DISCONTINUED | OUTPATIENT
Start: 2024-12-10 | End: 2024-12-10 | Stop reason: SDUPTHER

## 2024-12-10 RX ORDER — OXYCODONE AND ACETAMINOPHEN 5; 325 MG/1; MG/1
1 TABLET ORAL EVERY 6 HOURS PRN
Qty: 20 TABLET | Refills: 0 | Status: SHIPPED | OUTPATIENT
Start: 2024-12-10 | End: 2024-12-15

## 2024-12-10 RX ORDER — MAGNESIUM HYDROXIDE 1200 MG/15ML
LIQUID ORAL CONTINUOUS PRN
Status: COMPLETED | OUTPATIENT
Start: 2024-12-10 | End: 2024-12-10

## 2024-12-10 RX ORDER — SODIUM CHLORIDE, SODIUM LACTATE, POTASSIUM CHLORIDE, CALCIUM CHLORIDE 600; 310; 30; 20 MG/100ML; MG/100ML; MG/100ML; MG/100ML
INJECTION, SOLUTION INTRAVENOUS
Status: COMPLETED
Start: 2024-12-10 | End: 2024-12-10

## 2024-12-10 RX ORDER — SODIUM CHLORIDE 9 MG/ML
INJECTION, SOLUTION INTRAVENOUS PRN
Status: DISCONTINUED | OUTPATIENT
Start: 2024-12-10 | End: 2024-12-10 | Stop reason: HOSPADM

## 2024-12-10 RX ORDER — ROPIVACAINE HYDROCHLORIDE 5 MG/ML
INJECTION, SOLUTION EPIDURAL; INFILTRATION; PERINEURAL
Status: COMPLETED
Start: 2024-12-10 | End: 2024-12-10

## 2024-12-10 RX ORDER — PROPOFOL 10 MG/ML
INJECTION, EMULSION INTRAVENOUS
Status: DISCONTINUED | OUTPATIENT
Start: 2024-12-10 | End: 2024-12-10 | Stop reason: SDUPTHER

## 2024-12-10 RX ORDER — SODIUM CHLORIDE, SODIUM LACTATE, POTASSIUM CHLORIDE, CALCIUM CHLORIDE 600; 310; 30; 20 MG/100ML; MG/100ML; MG/100ML; MG/100ML
INJECTION, SOLUTION INTRAVENOUS CONTINUOUS
Status: DISCONTINUED | OUTPATIENT
Start: 2024-12-10 | End: 2024-12-10 | Stop reason: HOSPADM

## 2024-12-10 RX ORDER — FENTANYL CITRATE 0.05 MG/ML
50 INJECTION, SOLUTION INTRAMUSCULAR; INTRAVENOUS EVERY 10 MIN PRN
Status: DISCONTINUED | OUTPATIENT
Start: 2024-12-10 | End: 2024-12-10 | Stop reason: HOSPADM

## 2024-12-10 RX ORDER — DEXAMETHASONE SODIUM PHOSPHATE 10 MG/ML
INJECTION INTRAMUSCULAR; INTRAVENOUS
Status: DISCONTINUED | OUTPATIENT
Start: 2024-12-10 | End: 2024-12-10 | Stop reason: SDUPTHER

## 2024-12-10 RX ORDER — NALOXONE HYDROCHLORIDE 0.4 MG/ML
INJECTION, SOLUTION INTRAMUSCULAR; INTRAVENOUS; SUBCUTANEOUS PRN
Status: DISCONTINUED | OUTPATIENT
Start: 2024-12-10 | End: 2024-12-10 | Stop reason: HOSPADM

## 2024-12-10 RX ORDER — DIPHENHYDRAMINE HYDROCHLORIDE 50 MG/ML
12.5 INJECTION INTRAMUSCULAR; INTRAVENOUS
Status: DISCONTINUED | OUTPATIENT
Start: 2024-12-10 | End: 2024-12-10 | Stop reason: HOSPADM

## 2024-12-10 RX ORDER — KETOROLAC TROMETHAMINE 30 MG/ML
INJECTION, SOLUTION INTRAMUSCULAR; INTRAVENOUS
Status: DISCONTINUED | OUTPATIENT
Start: 2024-12-10 | End: 2024-12-10 | Stop reason: SDUPTHER

## 2024-12-10 RX ORDER — ROPIVACAINE HYDROCHLORIDE 5 MG/ML
INJECTION, SOLUTION EPIDURAL; INFILTRATION; PERINEURAL
Status: COMPLETED | OUTPATIENT
Start: 2024-12-10 | End: 2024-12-10

## 2024-12-10 RX ORDER — SODIUM CHLORIDE, SODIUM LACTATE, POTASSIUM CHLORIDE, CALCIUM CHLORIDE 600; 310; 30; 20 MG/100ML; MG/100ML; MG/100ML; MG/100ML
INJECTION, SOLUTION INTRAVENOUS
Status: DISCONTINUED | OUTPATIENT
Start: 2024-12-10 | End: 2024-12-10 | Stop reason: SDUPTHER

## 2024-12-10 RX ORDER — LIDOCAINE HYDROCHLORIDE 20 MG/ML
INJECTION, SOLUTION EPIDURAL; INFILTRATION; INTRACAUDAL; PERINEURAL PRN
Status: DISCONTINUED | OUTPATIENT
Start: 2024-12-10 | End: 2024-12-10 | Stop reason: ALTCHOICE

## 2024-12-10 RX ORDER — MEPERIDINE HYDROCHLORIDE 25 MG/ML
12.5 INJECTION INTRAMUSCULAR; INTRAVENOUS; SUBCUTANEOUS
Status: DISCONTINUED | OUTPATIENT
Start: 2024-12-10 | End: 2024-12-10 | Stop reason: HOSPADM

## 2024-12-10 RX ORDER — OXYCODONE HYDROCHLORIDE 5 MG/1
5 TABLET ORAL
Status: DISCONTINUED | OUTPATIENT
Start: 2024-12-10 | End: 2024-12-10 | Stop reason: HOSPADM

## 2024-12-10 RX ORDER — ONDANSETRON 2 MG/ML
INJECTION INTRAMUSCULAR; INTRAVENOUS
Status: DISCONTINUED | OUTPATIENT
Start: 2024-12-10 | End: 2024-12-10 | Stop reason: SDUPTHER

## 2024-12-10 RX ADMIN — PROPOFOL 200 MG: 10 INJECTION, EMULSION INTRAVENOUS at 07:35

## 2024-12-10 RX ADMIN — CEFAZOLIN 2000 MG: 1 INJECTION, POWDER, FOR SOLUTION INTRAMUSCULAR; INTRAVENOUS at 07:40

## 2024-12-10 RX ADMIN — DEXAMETHASONE SODIUM PHOSPHATE 8 MG: 10 INJECTION INTRAMUSCULAR; INTRAVENOUS at 07:41

## 2024-12-10 RX ADMIN — KETOROLAC TROMETHAMINE 30 MG: 30 INJECTION, SOLUTION INTRAMUSCULAR at 08:01

## 2024-12-10 RX ADMIN — ONDANSETRON 4 MG: 2 INJECTION INTRAMUSCULAR; INTRAVENOUS at 08:33

## 2024-12-10 RX ADMIN — ROPIVACAINE HYDROCHLORIDE 20 ML: 5 INJECTION, SOLUTION EPIDURAL; INFILTRATION; PERINEURAL at 07:23

## 2024-12-10 RX ADMIN — PROPOFOL 50 MG: 10 INJECTION, EMULSION INTRAVENOUS at 07:57

## 2024-12-10 RX ADMIN — LIDOCAINE HYDROCHLORIDE 60 MG: 20 INJECTION, SOLUTION EPIDURAL; INFILTRATION; INTRACAUDAL; PERINEURAL at 07:35

## 2024-12-10 RX ADMIN — SODIUM CHLORIDE, POTASSIUM CHLORIDE, SODIUM LACTATE AND CALCIUM CHLORIDE: 600; 310; 30; 20 INJECTION, SOLUTION INTRAVENOUS at 07:30

## 2024-12-10 ASSESSMENT — PAIN - FUNCTIONAL ASSESSMENT: PAIN_FUNCTIONAL_ASSESSMENT: 0-10

## 2024-12-10 ASSESSMENT — PAIN SCALES - GENERAL: PAINLEVEL_OUTOF10: 0

## 2024-12-10 NOTE — OP NOTE
Operative Note      Patient: Mark Anthony Vernon  YOB: 1990  MRN: [id number]    Date of Procedure: 12/10/2024    Pre-Op Diagnosis Codes:      * Primary osteoarthritis of left knee [M17.12]     * Loose body of left knee [M23.42]    Post-Op Diagnosis: Same       Procedure(s):  Left arthroscop with loose body removal - 4 loose bodies  Three compartment synovectomy    Surgeon(s):  Zachary Hill MD    Assistant:   Physician Assistant: Neto Fierro PA-C -whose assistance consisted of assistance with positioning the limb and and closing the wound    Anesthesia: Choice    Estimated Blood Loss (mL): Minimal    Complications: None    Specimens:   ID Type Source Tests Collected by Time Destination   A : loose body left knee Tissue Joint, Knee SURGICAL PATHOLOGY Zachary Hill MD 12/10/2024 0832        Implants:  * No implants in log *      Drains: * No LDAs found *    Findings:  Infection Present At Time Of Surgery (PATOS) (choose all levels that have infection present):  No infection present  Other Findings: 4 loose bodies in the knee, bony; tricompartmental osteoarthritis    Detailed Description of Procedure:   Patient was brought to the operating room.  After adequate induction of anesthesia by anesthesiology the patient was placed supine on the operating table.  A tourniquet was applied to the left upper thigh.  The left lower extremity was prepped and draped in sterile fashion with a ChloraPrep scrub.  The limb was exsanguinated and the tourniquet was insufflated.  Anterior medial and anterior lateral working portals were established.  The arthroscope was inserted into the anterolateral portal.  Immediately upon entering the joint there was a bony loose body identified in the patellofemoral articulation.  There were diffuse grade 4 changes on the undersurface of the patella and the corresponding femoral sulcus.  The anterolateral portal was opened and the shaver was inserted in.  The loose body was

## 2024-12-10 NOTE — DISCHARGE INSTRUCTIONS
[facility]  Dr. Zachary Hill Jr., MD Neto Fierro PA-C  [facility]    Post Operative Instructions for Knee Arthroscopy - Loose body removal     Incision and dressing  We took out 4 loose bodies from your knee. One about the size of peanut.   Your incisions are covered with 4 x 4's, absorbent pads, cotton, and an Ace wrap.  The dressing may be removed 2 days after surgery.    The incisions have been closed with skin glue.  The glue will flake off over time and fall off on its own.  DO NOT apply any lotions, creams, peroxide or Betadine to the skin glue, as it will degrade and dissolve the glue.  DO NOT peel the glue off, as this could result in opening of the incision.  There are no sutures that need to be removed; the skin and subcutaneous layers have been closed with dissolvable sutures, which will dissolve over 7 to 8 weeks.     Showering  Once the dressing has been removed in 2 days, you may shower.  Running soap and water over the incisions are fine.  No soaking or hot tubs or baths until 6 weeks after surgery.    Let the water run over the incision, and pat dry with a towel.  Do not scrub or rub the glue.        Activity  You will begin activity immediately after surgery.  Physical therapy will commence (at home or as an outpatient) within a few days of surgery.  An order has been placed for physical therapy.  You can call [facility] or [facility], and arrange therapy.  Your knee will be sore, despite the small incisions.  This is due to the fact that the knee needed to be filled with water in order to make room for the instruments to be utilized.    You will receive crutches.    It is okay to put weight on the leg as tolerated.  That does not mean that you need to walk on it immediately, it means that you may progress weightbearing as tolerated.    It is encouraged that you get up for short walks frequently throughout the day.  Pump your ankles up and down

## 2024-12-10 NOTE — ANESTHESIA POSTPROCEDURE EVALUATION
Department of Anesthesiology  Postprocedure Note    Patient: Mark Anthony Vernon  MRN: [id number]  YOB: 1990  Date of evaluation: 12/10/2024    Procedure Summary       Date: 12/10/24 Room / Location: [address]    Anesthesia Start: 0730 Anesthesia Stop: 0846    Procedure: Left arthroscop with loose body removal (Left) Diagnosis:       Primary osteoarthritis of left knee      Loose body of left knee      (Primary osteoarthritis of left knee [M17.12])      (Loose body of left knee [M23.42])    Surgeons: Zachary Hill MD Responsible Provider: Macey Benz MD    Anesthesia Type: general, regional ASA Status: 2            Anesthesia Type: No value filed.    Dante Phase I:      Dante Phase II:      Anesthesia Post Evaluation    Patient location during evaluation: PACU  Patient participation: complete - patient participated  Level of consciousness: sleepy but conscious  Pain score: 0  Airway patency: patent  Nausea & Vomiting: no vomiting and no nausea  Cardiovascular status: hemodynamically stable  Respiratory status: face mask and oral airway  Hydration status: stable  Multimodal analgesia pain management approach  Pain management: adequate    No notable events documented.

## 2024-12-10 NOTE — ANESTHESIA PROCEDURE NOTES
Peripheral Block    Patient location during procedure: pre-op  Reason for block: post-op pain management and at surgeon's request  Start time: 12/10/2024 7:23 AM  End time: 12/10/2024 7:28 AM  Staffing  Performed: anesthesiologist   Anesthesiologist: Macey Benz MD  Performed by: Macey Benz MD  Authorized by: Macey Benz MD    Preanesthetic Checklist  Completed: patient identified, IV checked, site marked, risks and benefits discussed, surgical/procedural consents, equipment checked, pre-op evaluation, timeout performed, anesthesia consent given, oxygen available and monitors applied/VS acknowledged  Peripheral Block   Patient position: supine  Prep: ChloraPrep  Provider prep: mask and sterile gloves (Sterile probe cover)  Patient monitoring: cardiac monitor, continuous pulse ox, frequent blood pressure checks and IV access  Block type: Saphenous  Laterality: left  Injection technique: single-shot  Guidance: ultrasound guided  Local infiltration: lidocaine  Infiltration strength: 2 %  Local infiltration: lidocaine  Dose: 1 mL    Needle   Needle type: combined needle/nerve stimulator   Needle gauge: 21 G  Needle localization: anatomical landmarks and ultrasound guidance  Needle length: 10 cm  Assessment   Injection assessment: negative aspiration for heme, no paresthesia on injection and local visualized surrounding nerve on ultrasound  Paresthesia pain: immediately resolved  Slow fractionated injection: yes  Hemodynamics: stable    Additional Notes  Ultrasound guidance used to view needle for placement.    Ultrasound image stored,  printed and saved in patient chart.    Sterile probe cover used    Medications Administered  ropivacaine (NAROPIN) injection 0.5% - Perineural   20 mL - 12/10/2024 7:23:00 AM

## 2024-12-10 NOTE — ANESTHESIA PRE PROCEDURE
Abdominal:             Vascular: negative vascular ROS.         Other Findings:       Anesthesia Plan      general and regional     ASA 2     (US guided Saphenous nerve block)  Induction: intravenous.    MIPS: Postoperative opioids intended and Prophylactic antiemetics administered.  Anesthetic plan and risks discussed with patient.    Use of blood products discussed with patient whom consented to blood products.    Plan discussed with surgical team.    Attending anesthesiologist reviewed and agrees with Preprocedure content      Post-op pain plan if not by surgeon: regional        Macey Benz MD   12/10/2024

## 2024-12-10 NOTE — PROGRESS NOTES
Patient ID:  Mark Anthony Vernon  [id number]  34 y.o.  1990  0846 Patient received in PACU BED 2 Report received from Anesthesia and Surgical Nurse.   Attached to Monitor, VSS, See Nursing Assessment and Flowsheets for detailed Information       Awake, following commands, answering questions appropriately. O2 Sats>92 on Room air  Taking po fluids well, VSS.  IV Discontinued per protocol, catheter intact, patient tolerated well.  Discharge Instructions given, patient verbalizes and understanding.  Pt to be discharged to home with responsible adult     Electronically signed by Libby Tai RN on 12/10/24

## 2024-12-10 NOTE — H&P
The history and physical in the electronic medical record dated 12/3/24 was personally reviewed.  There are no interval changes that need to be made.  Plan is to proceed with a left knee chondroplasty and removal of multiple intra-articular loose bodies as scheduled.  Possibility of infection and aching in the knee due to pre-existing degenerative changes were discussed.

## 2024-12-13 ENCOUNTER — TELEPHONE (OUTPATIENT)
Dept: ORTHOPEDIC SURGERY | Age: 34
End: 2024-12-13

## 2024-12-13 NOTE — TELEPHONE ENCOUNTER
Patient called and needed an external PT order placed in his chart. Order placed today for pt to begin PT.

## (undated) DEVICE — [TOMCAT CUTTER, ARTHROSCOPIC SHAVER BLADE,  DO NOT RESTERILIZE,  DO NOT USE IF PACKAGE IS DAMAGED,  KEEP DRY,  KEEP AWAY FROM SUNLIGHT]: Brand: FORMULA

## (undated) DEVICE — LABEL MED MINI W/ MARKER

## (undated) DEVICE — [AGGRESSIVE PLUS CUTTER, ARTHROSCOPIC SHAVER BLADE,  DO NOT RESTERILIZE,  DO NOT USE IF PACKAGE IS DAMAGED,  KEEP DRY,  KEEP AWAY FROM SUNLIGHT]: Brand: FORMULA

## (undated) DEVICE — CONNECTOR,T,STERILE: Brand: MEDLINE

## (undated) DEVICE — STOCKINETTE IMPERV L 12X48IN POLY INNR LAYR COT ORTH EXT

## (undated) DEVICE — LOWER EXTREMITY: Brand: MEDLINE INDUSTRIES, INC.

## (undated) DEVICE — BANDAGE COBAN 4 IN COMPR W4INXL5YD FOAM COHESIVE QUIK STK SELF ADH SFT

## (undated) DEVICE — HYPODERMIC SAFETY NEEDLE: Brand: MAGELLAN

## (undated) DEVICE — PADDING UNDERCAST W4INXL12FT RAYON POLY SYN NONADHESIVE

## (undated) DEVICE — ADHESIVE SKIN CLSR 0.7ML TOP DERMBND ADV

## (undated) DEVICE — BANDAGE COMPR W6INXL5YD WHT BGE POLY COT M E WRP WV HK AND

## (undated) DEVICE — MAT FLR ABSRB ECODRI-SAFE

## (undated) DEVICE — TUBING, SUCTION, 1/4" X 10', STRAIGHT: Brand: MEDLINE

## (undated) DEVICE — MARKER SURG SKIN GENTIAN VLT REG TIP W/ 6IN RUL DYNJSM01

## (undated) DEVICE — INTENDED FOR TISSUE SEPARATION, AND OTHER PROCEDURES THAT REQUIRE A SHARP SURGICAL BLADE TO PUNCTURE OR CUT.: Brand: BARD-PARKER ® CARBON RIB-BACK BLADES

## (undated) DEVICE — SUTURE MONOCRYL SZ 3-0 L27IN ABSRB UD L19MM PS-2 3/8 CIR PRIM Y427H

## (undated) DEVICE — SHEET,DRAPE,53X77,STERILE: Brand: MEDLINE

## (undated) DEVICE — NEEDLE SPNL 18GA L3.5IN W/ QNCKE SHARPER BVL DURA CLICK

## (undated) DEVICE — 3M™ STERI-DRAPE™ U-DRAPE 1015: Brand: STERI-DRAPE™

## (undated) DEVICE — TUBING PMP L8FT LNG W/ CONN FOR AR-6400 REDEUCE

## (undated) DEVICE — GLOVE ORANGE PI 8 1/2   MSG9085

## (undated) DEVICE — SYRINGE MED 10ML LUERLOCK TIP W/O SFTY DISP

## (undated) DEVICE — APPLICATOR MEDICATED 26 CC SOLUTION HI LT ORNG CHLORAPREP

## (undated) DEVICE — 4-PORT MANIFOLD: Brand: NEPTUNE 2